# Patient Record
Sex: MALE | Race: WHITE | Employment: OTHER | URBAN - NONMETROPOLITAN AREA
[De-identification: names, ages, dates, MRNs, and addresses within clinical notes are randomized per-mention and may not be internally consistent; named-entity substitution may affect disease eponyms.]

---

## 2016-11-30 LAB
ALT SERPL-CCNC: 40 U/L (ref 18–65)
AST SERPL-CCNC: 22 U/L (ref 10–40)
CREAT SERPL-MCNC: 0.89 MG/DL (ref 0.8–1.5)
GLUCOSE SERPL-MCNC: 116 MG/DL (ref 60–99)
POTASSIUM SERPL-SCNC: 4.2 MEQ/L (ref 3.5–5.1)

## 2017-04-06 ENCOUNTER — TRANSFERRED RECORDS (OUTPATIENT)
Dept: HEALTH INFORMATION MANAGEMENT | Facility: HOSPITAL | Age: 62
End: 2017-04-06

## 2017-04-06 LAB
ALT SERPL-CCNC: 39 U/L (ref 18–65)
AST SERPL-CCNC: 23 U/L (ref 10–40)
CREAT SERPL-MCNC: 0.93 MG/DL (ref 0.8–1.5)
GLUCOSE SERPL-MCNC: 126 MG/DL (ref 60–99)
HBA1C MFR BLD: 6.3 % (ref 4–6)
POTASSIUM SERPL-SCNC: 4.2 MEQ/L (ref 3.5–5.1)

## 2017-04-07 DIAGNOSIS — R06.02 SOB (SHORTNESS OF BREATH): Primary | ICD-10-CM

## 2017-04-07 DIAGNOSIS — Z86.711 HISTORY OF PULMONARY EMBOLISM: ICD-10-CM

## 2017-04-07 DIAGNOSIS — J44.9 COPD, SEVERE (H): ICD-10-CM

## 2017-04-13 ENCOUNTER — TELEPHONE (OUTPATIENT)
Dept: WOUND CARE | Facility: OTHER | Age: 62
End: 2017-04-13

## 2017-04-13 DIAGNOSIS — E11.65 TYPE 2 DIABETES MELLITUS WITH HYPERGLYCEMIA, WITHOUT LONG-TERM CURRENT USE OF INSULIN (H): Primary | ICD-10-CM

## 2017-04-13 NOTE — TELEPHONE ENCOUNTER
Patient has an appointment with Diabetic Education. Needs a new referral, referral pending. Please sign.

## 2017-04-17 ENCOUNTER — HOSPITAL ENCOUNTER (OUTPATIENT)
Dept: CT IMAGING | Facility: HOSPITAL | Age: 62
Discharge: HOME | End: 2017-04-17
Attending: FAMILY MEDICINE | Admitting: FAMILY MEDICINE
Payer: MEDICARE

## 2017-04-17 PROCEDURE — 71275 CT ANGIOGRAPHY CHEST: CPT | Mod: TC

## 2017-04-17 RX ORDER — IOPAMIDOL 612 MG/ML
100 INJECTION, SOLUTION INTRAVASCULAR ONCE
Status: COMPLETED | OUTPATIENT
Start: 2017-04-17 | End: 2017-04-17

## 2017-04-17 RX ADMIN — IOPAMIDOL 75 ML: 612 INJECTION, SOLUTION INTRAVASCULAR at 16:15

## 2017-10-20 ENCOUNTER — TRANSFERRED RECORDS (OUTPATIENT)
Dept: HEALTH INFORMATION MANAGEMENT | Facility: HOSPITAL | Age: 62
End: 2017-10-20

## 2017-12-12 ENCOUNTER — HOSPITAL ENCOUNTER (OUTPATIENT)
Dept: EDUCATION SERVICES | Facility: HOSPITAL | Age: 62
Discharge: HOME | End: 2017-12-12
Attending: NURSE PRACTITIONER | Admitting: NURSE PRACTITIONER
Payer: MEDICARE

## 2017-12-12 VITALS
WEIGHT: 290.5 LBS | HEIGHT: 69 IN | SYSTOLIC BLOOD PRESSURE: 139 MMHG | OXYGEN SATURATION: 86 % | BODY MASS INDEX: 43.03 KG/M2 | DIASTOLIC BLOOD PRESSURE: 79 MMHG | HEART RATE: 93 BPM

## 2017-12-12 DIAGNOSIS — Z79.4 TYPE 2 DIABETES MELLITUS WITHOUT COMPLICATION, WITH LONG-TERM CURRENT USE OF INSULIN (H): Primary | ICD-10-CM

## 2017-12-12 DIAGNOSIS — E11.9 TYPE 2 DIABETES MELLITUS WITHOUT COMPLICATION, WITH LONG-TERM CURRENT USE OF INSULIN (H): Primary | ICD-10-CM

## 2017-12-12 PROCEDURE — G0108 DIAB MANAGE TRN  PER INDIV: HCPCS

## 2017-12-12 ASSESSMENT — PAIN SCALES - GENERAL: PAINLEVEL: MODERATE PAIN (5)

## 2017-12-12 NOTE — IP AVS SNAPSHOT
"                  MRN:[id number]                      After Visit Summary   12/12/2017    Freddy JENNY Bermudez    MRN: [id number]           Thank you!     Thank you for choosing [facility] for your care. Our goal is always to provide you with excellent care. Hearing back from our patients is one way we can continue to improve our services. Please take a few minutes to complete the written survey that you may receive in the mail after you visit with us. Thank you!        Patient Information     Date Of Birth          1955        About your hospital stay     You were admitted on:  December 12, 2017 You last received care in the:  [facility]    You were discharged on:  December 12, 2017       Who to Call     For medical emergencies, please call 911.  For non-urgent questions about your medical care, please call your primary care provider or clinic, [phone]          Attending Provider     Provider Specialty    Keily Rivas NP Nurse Practitioner - Family       Primary Care Provider Office Phone # Fax #    Umer Gasca [phone]      Your next 10 appointments already scheduled     Jan 02, 2018  1:00 PM CST   (Arrive by 12:45 PM)   Return Visit with Krystle Jose RN   [facility] ([facility] )    [address] [place] [id number] [phone]              Further instructions from your care team       Test BG once a day varying times:    Odd days: test in morning before coffee or food.    Even days: test two hours after your evening    Return to Diabetes Ed in 2 weeks.    Pending Results     No orders found from 12/10/2017 to 12/13/2017.            Admission Information     Date & Time Provider Department Dept. Phone    12/12/2017 Keily Rivas NP [facility] [phone]      Your Vitals Were     Blood Pressure Pulse Height Weight Pulse Oximetry BMI (Body Mass Index)    139/79 93 1.753 m (5' 9\") 131.8 kg (290 lb 8 oz) 86% 42.9 kg/m2    " "  MyChart Information     Jamn lets you send messages to your doctor, view your test results, renew your prescriptions, schedule appointments and more. To sign up, go to [email or web address] . Click on \"Log in\" on the left side of the screen, which will take you to the Welcome page. Then click on \"Sign up Now\" on the right side of the page.     You will be asked to enter the access code listed below, as well as some personal information. Please follow the directions to create your username and password.     Your access code is: [email or web address]  Expires: 3/12/2018  2:30 PM     Your access code will  in 90 days. If you need help or a new code, please call your [place] clinic or [phone].        Care EveryWhere ID     This is your Care EveryWhere ID. This could be used by other organizations to access your [place] medical records  [id number]        Equal Access to Services     DOV TOM : Hadii johnny thomas hadasho Soomaali, waaxda luqadaha, qaybta kaalmada adeegyada, himanshu bethea . So [facility] [phone].    ATENCIÓN: Si alexandre irby, tiene a romero disposición servicios gratuitos de asistencia lingüística. Llame al [phone].    We comply with applicable federal civil rights laws and [place] laws. We do not discriminate on the basis of race, color, national origin, age, disability, sex, sexual orientation, or gender identity.               Review of your medicines      Notice     You have not been prescribed any medications.             Protect others around you: Learn how to safely use, store and throw away your medicines at www.disposemymeds.org.             Medication List: This is a list of all your medications and when to take them. Check marks below indicate your daily home schedule. Keep this list as a reference.      Notice     You have not been prescribed any medications.      "

## 2017-12-12 NOTE — NURSING NOTE
"Chief Complaint   Patient presents with     Diabetes     session 1       Initial /79  Pulse 93  Ht 1.753 m (5' 9\")  Wt 131.8 kg (290 lb 8 oz)  SpO2 (!) 86%  BMI 42.9 kg/m2 Estimated body mass index is 42.9 kg/(m^2) as calculated from the following:    Height as of this encounter: 1.753 m (5' 9\").    Weight as of this encounter: 131.8 kg (290 lb 8 oz).  Medication Reconciliation: complete   Rhoda Weber      "

## 2017-12-12 NOTE — MR AVS SNAPSHOT
"              After Visit Summary   2017    Freddy JENNY Bermudez    MRN: [id number]           Patient Information     Date Of Birth          1955        Visit Information        Provider Department      2017  1:00 PM Krystle Jose RN [facility]         Follow-ups after your visit        Your next 10 appointments already scheduled     2018  1:00 PM CST   (Arrive by 12:45 PM)   Return Visit with Krystle Jose RN   [facility] ([facility] )    [address] [place] [id number] [phone]              Who to contact     If you have questions or need follow up information about today's clinic visit or your schedule please contact [facility] directly at [phone].  Normal or non-critical lab and imaging results will be communicated to you by [employer], letter or phone within 4 business days after the clinic has received the results. If you do not hear from us within 7 days, please contact the clinic through [employer] or phone. If you have a critical or abnormal lab result, we will notify you by phone as soon as possible.  Submit refill requests through [employer] or call your pharmacy and they will forward the refill request to us. Please allow 3 business days for your refill to be completed.          Additional Information About Your Visit        [employer] Information     [employer] lets you send messages to your doctor, view your test results, renew your prescriptions, schedule appointments and more. To sign up, go to [email or web address] . Click on \"Log in\" on the left side of the screen, which will take you to the Welcome page. Then click on \"Sign up Now\" on the right side of the page.     You will be asked to enter the access code listed below, as well as some personal information. Please follow the directions to create your username and password.     Your access code is: [email or web address]  Expires: 3/12/2018  2:30 PM     Your access code will  in 90 " "days. If you need help or a new code, please call your [place] clinic or [phone].        Care EveryWhere ID     This is your Care EveryWhere ID. This could be used by other organizations to access your [place] medical records  [id number]        Your Vitals Were     Pulse Height Pulse Oximetry BMI (Body Mass Index)          93 1.753 m (5' 9\") 86% 42.9 kg/m2         Blood Pressure from Last 3 Encounters:   12/12/17 139/79   10/31/15 149/89   10/31/15 149/89    Weight from Last 3 Encounters:   12/12/17 131.8 kg (290 lb 8 oz)   03/05/15 102 kg (224 lb 13.9 oz)   03/25/14 102.1 kg (225 lb)              Today, you had the following     No orders found for display       Primary Care Provider Office Phone # Fax #    Umer Gasca [phone]       [facility] [address] [facility] [address]        Equal Access to Services     [facility] Center[facility] : Hadii aad ku hadasho Soomaali, waaxda luqadaha, qaybta kaalmada adeegyada, waxay idiin hayalejandro bethea . So [facility] [place] [phone].    ATENCIÓN: Si habla español, tiene a romero disposición servicios gratuitos de asistencia lingüística. Quincyame al [phone].    We comply with applicable federal civil rights laws and [place] laws. We do not discriminate on the basis of race, color, national origin, age, disability, sex, sexual orientation, or gender identity.            Thank you!     Thank you for choosing [facility]  for your care. Our goal is always to provide you with excellent care. Hearing back from our patients is one way we can continue to improve our services. Please take a few minutes to complete the written survey that you may receive in the mail after your visit with us. Thank you!             Your Updated Medication List - Protect others around you: Learn how to safely use, store and throw away your medicines at www.disposemymeds.org.      Notice     You have not been prescribed any medications.      "

## 2017-12-12 NOTE — DISCHARGE INSTRUCTIONS
Test BG once a day varying times:    Odd days: test in morning before coffee or food.    Even days: test two hours after your evening    Return to Diabetes Ed in 2 weeks.

## 2017-12-13 ENCOUNTER — TRANSFERRED RECORDS (OUTPATIENT)
Dept: HEALTH INFORMATION MANAGEMENT | Facility: HOSPITAL | Age: 62
End: 2017-12-13

## 2017-12-13 LAB — HBA1C MFR BLD: 5.9 % (ref 4–6)

## 2017-12-14 NOTE — PROGRESS NOTES
"Freddy is here for Session 1.    BG readings as follows: not testing yet    Blood pressure 139/79, pulse 93, height 1.753 m (5' 9\"), weight 131.8 kg (290 lb 8 oz), SpO2 (!) 86 %.    Current diabetes medications: none    Readiness to learn: eager    Barriers to learning: none    Freddy brought his grocery list for review. Discussed usual foods eaten. Has been selecting appropriate food choices and portions.    Topics covered: Diabetes pathophysiology, symptoms, diagnosis, treatments, medication actions, BG self-monitoring, HgbA1c, targets (blood sugar/A1c), sharps disposal, complications and risk factors,  food planning, benefits of physical activity, carbohydrate counting, and individualized food plan.     Pt actively participated and had many questions and demonstrated adequate understanding of topics discussed.    Pt instructed on Contour Next glucose meter and appropriately demonstrated use with minimal cueing. Testing supplies ordered.     Plan:   Test BG once a day varying times:    Odd days: test in morning before coffee or food.    Even days: test two hours after your evening    Sent a message to Dr. Gasca requesting re-check of A1C at Freddy's office visit with him tomorrow.    Return to Diabetes Ed in 2 weeks.      Total time spent with patient: 60 minutes    "

## 2017-12-20 ENCOUNTER — HOSPITAL ENCOUNTER (OUTPATIENT)
Dept: MRI IMAGING | Facility: HOSPITAL | Age: 62
Discharge: HOME | End: 2017-12-20
Attending: FAMILY MEDICINE | Admitting: FAMILY MEDICINE
Payer: MEDICARE

## 2017-12-20 DIAGNOSIS — M25.561 RIGHT KNEE PAIN: ICD-10-CM

## 2017-12-20 PROCEDURE — 73721 MRI JNT OF LWR EXTRE W/O DYE: CPT | Mod: TC,RT

## 2018-11-25 ENCOUNTER — HOSPITAL ENCOUNTER (EMERGENCY)
Facility: HOSPITAL | Age: 63
Discharge: ANOTHER HOSPITAL | End: 2018-11-25
Attending: PHYSICIAN ASSISTANT | Admitting: PHYSICIAN ASSISTANT
Payer: MEDICARE

## 2018-11-25 ENCOUNTER — APPOINTMENT (OUTPATIENT)
Dept: CT IMAGING | Facility: HOSPITAL | Age: 63
End: 2018-11-25
Attending: PHYSICIAN ASSISTANT
Payer: MEDICARE

## 2018-11-25 VITALS
TEMPERATURE: 97.9 F | OXYGEN SATURATION: 87 % | RESPIRATION RATE: 10 BRPM | DIASTOLIC BLOOD PRESSURE: 97 MMHG | SYSTOLIC BLOOD PRESSURE: 164 MMHG | HEART RATE: 87 BPM

## 2018-11-25 DIAGNOSIS — I71.30 RUPTURED ABDOMINAL AORTIC ANEURYSM (AAA) (H): ICD-10-CM

## 2018-11-25 LAB
ALBUMIN SERPL-MCNC: 3.2 G/DL (ref 3.4–5)
ALP SERPL-CCNC: 86 U/L (ref 40–150)
ALT SERPL W P-5'-P-CCNC: 29 U/L (ref 0–70)
ANION GAP SERPL CALCULATED.3IONS-SCNC: 9 MMOL/L (ref 3–14)
AST SERPL W P-5'-P-CCNC: 15 U/L (ref 0–45)
BASE EXCESS BLDV CALC-SCNC: 1.3 MMOL/L
BASOPHILS # BLD AUTO: 0 10E9/L (ref 0–0.2)
BASOPHILS NFR BLD AUTO: 0.3 %
BILIRUB SERPL-MCNC: 0.7 MG/DL (ref 0.2–1.3)
BLD PROD TYP BPU: NORMAL
BLD PROD TYP BPU: NORMAL
BLD UNIT ID BPU: 0
BLD UNIT ID BPU: 0
BLOOD PRODUCT CODE: NORMAL
BLOOD PRODUCT CODE: NORMAL
BPU ID: NORMAL
BPU ID: NORMAL
BUN SERPL-MCNC: 12 MG/DL (ref 7–30)
CALCIUM SERPL-MCNC: 8.2 MG/DL (ref 8.5–10.1)
CHLORIDE SERPL-SCNC: 100 MMOL/L (ref 94–109)
CO2 SERPL-SCNC: 25 MMOL/L (ref 20–32)
CREAT SERPL-MCNC: 0.66 MG/DL (ref 0.66–1.25)
CRP SERPL-MCNC: 19 MG/L (ref 0–8)
DIFFERENTIAL METHOD BLD: ABNORMAL
EOSINOPHIL # BLD AUTO: 0.1 10E9/L (ref 0–0.7)
EOSINOPHIL NFR BLD AUTO: 0.7 %
ERYTHROCYTE [DISTWIDTH] IN BLOOD BY AUTOMATED COUNT: 16 % (ref 10–15)
GFR SERPL CREATININE-BSD FRML MDRD: >90 ML/MIN/1.7M2
GLUCOSE SERPL-MCNC: 153 MG/DL (ref 70–99)
HCO3 BLDV-SCNC: 28 MMOL/L (ref 21–28)
HCT VFR BLD AUTO: 57 % (ref 40–53)
HGB BLD-MCNC: 19.4 G/DL (ref 13.3–17.7)
IMM GRANULOCYTES # BLD: 0 10E9/L (ref 0–0.4)
IMM GRANULOCYTES NFR BLD: 0.3 %
LYMPHOCYTES # BLD AUTO: 0.9 10E9/L (ref 0.8–5.3)
LYMPHOCYTES NFR BLD AUTO: 10.1 %
MCH RBC QN AUTO: 32.7 PG (ref 26.5–33)
MCHC RBC AUTO-ENTMCNC: 34 G/DL (ref 31.5–36.5)
MCV RBC AUTO: 96 FL (ref 78–100)
MONOCYTES # BLD AUTO: 0.6 10E9/L (ref 0–1.3)
MONOCYTES NFR BLD AUTO: 6.6 %
NEUTROPHILS # BLD AUTO: 7.2 10E9/L (ref 1.6–8.3)
NEUTROPHILS NFR BLD AUTO: 82 %
NRBC # BLD AUTO: 0 10*3/UL
NRBC BLD AUTO-RTO: 0 /100
O2/TOTAL GAS SETTING VFR VENT: 36 %
OXYHGB MFR BLDV: 86 %
PCO2 BLDV: 47 MM HG (ref 40–50)
PH BLDV: 7.38 PH (ref 7.32–7.43)
PLATELET # BLD AUTO: 135 10E9/L (ref 150–450)
PO2 BLDV: 57 MM HG (ref 25–47)
POTASSIUM SERPL-SCNC: 3.7 MMOL/L (ref 3.4–5.3)
PROT SERPL-MCNC: 7.1 G/DL (ref 6.8–8.8)
RADIOLOGIST FLAGS: ABNORMAL
RBC # BLD AUTO: 5.94 10E12/L (ref 4.4–5.9)
SODIUM SERPL-SCNC: 134 MMOL/L (ref 133–144)
TRANSFUSION STATUS PATIENT QL: NORMAL
WBC # BLD AUTO: 8.7 10E9/L (ref 4–11)

## 2018-11-25 PROCEDURE — 86900 BLOOD TYPING SEROLOGIC ABO: CPT | Performed by: PHYSICIAN ASSISTANT

## 2018-11-25 PROCEDURE — 74177 CT ABD & PELVIS W/CONTRAST: CPT | Mod: TC

## 2018-11-25 PROCEDURE — 36415 COLL VENOUS BLD VENIPUNCTURE: CPT | Performed by: PHYSICIAN ASSISTANT

## 2018-11-25 PROCEDURE — 85025 COMPLETE CBC W/AUTO DIFF WBC: CPT | Performed by: PHYSICIAN ASSISTANT

## 2018-11-25 PROCEDURE — 25000128 H RX IP 250 OP 636: Performed by: PHYSICIAN ASSISTANT

## 2018-11-25 PROCEDURE — 96375 TX/PRO/DX INJ NEW DRUG ADDON: CPT | Mod: XU

## 2018-11-25 PROCEDURE — 36430 TRANSFUSION BLD/BLD COMPNT: CPT

## 2018-11-25 PROCEDURE — 25500064 ZZH RX 255 OP 636: Performed by: PHYSICIAN ASSISTANT

## 2018-11-25 PROCEDURE — 80053 COMPREHEN METABOLIC PANEL: CPT | Performed by: PHYSICIAN ASSISTANT

## 2018-11-25 PROCEDURE — 86922 COMPATIBILITY TEST ANTIGLOB: CPT | Performed by: PHYSICIAN ASSISTANT

## 2018-11-25 PROCEDURE — 99285 EMERGENCY DEPT VISIT HI MDM: CPT | Mod: 25

## 2018-11-25 PROCEDURE — 86140 C-REACTIVE PROTEIN: CPT | Performed by: PHYSICIAN ASSISTANT

## 2018-11-25 PROCEDURE — 96374 THER/PROPH/DIAG INJ IV PUSH: CPT | Mod: XU

## 2018-11-25 PROCEDURE — 86850 RBC ANTIBODY SCREEN: CPT | Performed by: PHYSICIAN ASSISTANT

## 2018-11-25 PROCEDURE — 96361 HYDRATE IV INFUSION ADD-ON: CPT

## 2018-11-25 PROCEDURE — 86901 BLOOD TYPING SEROLOGIC RH(D): CPT | Performed by: PHYSICIAN ASSISTANT

## 2018-11-25 PROCEDURE — 82805 BLOOD GASES W/O2 SATURATION: CPT | Performed by: PHYSICIAN ASSISTANT

## 2018-11-25 PROCEDURE — P9016 RBC LEUKOCYTES REDUCED: HCPCS | Performed by: PHYSICIAN ASSISTANT

## 2018-11-25 PROCEDURE — 99285 EMERGENCY DEPT VISIT HI MDM: CPT | Performed by: PHYSICIAN ASSISTANT

## 2018-11-25 RX ORDER — IOPAMIDOL 612 MG/ML
100 INJECTION, SOLUTION INTRAVASCULAR ONCE
Status: COMPLETED | OUTPATIENT
Start: 2018-11-25 | End: 2018-11-25

## 2018-11-25 RX ORDER — ONDANSETRON 2 MG/ML
4 INJECTION INTRAMUSCULAR; INTRAVENOUS ONCE
Status: COMPLETED | OUTPATIENT
Start: 2018-11-25 | End: 2018-11-25

## 2018-11-25 RX ORDER — LABETALOL HYDROCHLORIDE 5 MG/ML
10 INJECTION, SOLUTION INTRAVENOUS ONCE
Status: COMPLETED | OUTPATIENT
Start: 2018-11-25 | End: 2018-11-25

## 2018-11-25 RX ORDER — LABETALOL HYDROCHLORIDE 5 MG/ML
20 INJECTION, SOLUTION INTRAVENOUS ONCE
Status: COMPLETED | OUTPATIENT
Start: 2018-11-25 | End: 2018-11-25

## 2018-11-25 RX ORDER — MORPHINE SULFATE 4 MG/ML
4 INJECTION, SOLUTION INTRAMUSCULAR; INTRAVENOUS ONCE
Status: COMPLETED | OUTPATIENT
Start: 2018-11-25 | End: 2018-11-25

## 2018-11-25 RX ADMIN — SODIUM CHLORIDE 1000 ML: 9 INJECTION, SOLUTION INTRAVENOUS at 10:42

## 2018-11-25 RX ADMIN — ONDANSETRON 4 MG: 2 INJECTION INTRAMUSCULAR; INTRAVENOUS at 10:43

## 2018-11-25 RX ADMIN — MORPHINE SULFATE 4 MG: 4 INJECTION INTRAVENOUS at 10:46

## 2018-11-25 RX ADMIN — LABETALOL HYDROCHLORIDE 10 MG: 5 INJECTION, SOLUTION INTRAVENOUS at 12:13

## 2018-11-25 RX ADMIN — LABETALOL HYDROCHLORIDE 10 MG: 5 INJECTION, SOLUTION INTRAVENOUS at 12:12

## 2018-11-25 RX ADMIN — LABETALOL HYDROCHLORIDE 20 MG: 5 INJECTION, SOLUTION INTRAVENOUS at 11:49

## 2018-11-25 RX ADMIN — IOPAMIDOL 100 ML: 612 INJECTION, SOLUTION INTRAVENOUS at 11:28

## 2018-11-25 ASSESSMENT — ENCOUNTER SYMPTOMS
MUSCULOSKELETAL NEGATIVE: 1
DIARRHEA: 0
CHILLS: 1
RECTAL PAIN: 0
DYSURIA: 0
ABDOMINAL PAIN: 1
NAUSEA: 1
VOMITING: 0
APPETITE CHANGE: 0
CONSTIPATION: 0
ABDOMINAL DISTENTION: 0
FEVER: 0
HEMATURIA: 0
SORE THROAT: 0
SHORTNESS OF BREATH: 1
NEUROLOGICAL NEGATIVE: 1
BLOOD IN STOOL: 0
UNEXPECTED WEIGHT CHANGE: 0
ANAL BLEEDING: 0
HEADACHES: 0

## 2018-11-25 NOTE — ED NOTES
1st unit of PRBC's started, unable to scan d/t emergent condition, infusing at 250ML/HR. No signs of reaction

## 2018-11-25 NOTE — DISCHARGE INSTRUCTIONS
What to expect when you have contrast    During your exam, we will inject  contrast  into your vein or artery. (Contrast is a clear liquid with iodine in it. It shows up on X-rays.)    You may feel warm or hot. You may have a metal taste in your mouth and a slight upset stomach. You may also feel pressure near the kidneys and bladder. These effects will last about 1 to 3 minutes.    Please tell us if you have:    Sneezing     Itching    Hives     Swelling in the face    A hoarse voice    Breathing problems    Other new symptoms    Serious problems are rare.  They may include:    Irregular heartbeat     Seizures    Kidney failure              Tissue damage    Shock      Death    If you have any problems during the exam, we  will treat them right away.    When you get home    Call your hospital if you have any new symptoms in the next 2 days, like hives or swelling. (Phone numbers are at the bottom of this page.) Or call your family doctor.     If you have wheezing or trouble breathing, call 911.    Self-care  -Drink at least 4 extra glasses of water today.   This reduces the stress on your kidneys.  -Keep taking your regular medicines.    The contrast will pass out of your body in your  Urine(pee). This will happen in the next 24 hours. You  will not feel this. Your urine will not  change color.    If you have kidney problems or take metformin    Drink 4 to 8 large glasses of water for the next  2 days, if you are not on a fluid restriction.    ?If you take metformin (Glucophage or Glucovance) for diabetes, keep taking it.      ?Your kidney function tests are abnormal.  If you take Metformin, do not take it for 48 hours. Please go to your clinic for a blood test within 3 days after your exam before the restarting this medicine.     (Note to provider:please give patient prescription for lab tests.)    ?Special instructions:     I have read and understand the above information.    Patient Sign  Here:______________________________________Date:________Time:______    Staff Sign Here:________________________________________Date:_______Time:______      Radiology Departments:     ?[facility]: [phone] ?[facility]: [phone]     ?[facility]: [phone] ?[place]:[phone]      ?[facility]: [phone]  ?[place]: [phone]  ?[place]:[phone]    ?[facility]:[phone]  ?[facility]:[phone]

## 2018-11-25 NOTE — ED NOTES
"Presents with lower abd pain, rates 7/10, \"pain started last night.\" Has felt nauseated, reports no vomiting. Does have history of COPD and sleep apnea. See assessments. Call light placed within reach.   "

## 2018-11-25 NOTE — ED PROVIDER NOTES
History     Chief Complaint   Patient presents with     Abdominal Pain     started last night, no diarrhea or constipation     HPI  Freddy Bermudez is a 63 year old male who presents with lower abdominal pain, chills, and nausea since last night. He has h/o COPD, non-oxygen dependant, though was 84%RA in triage. He admits to chronic dyspnea with exertion, denies any worsening dyspnea recently, any CP, or cough. Normal, large BM this AM. Denies black/bloody stools or diarrhea. H/o cholecystectomy and appendectomy. Denies hematuria or dysuria.     Problem List:    Patient Active Problem List    Diagnosis Date Noted     Hypoxia 03/04/2015     Priority: Medium     COPD with acute bronchitis (H) 03/04/2015     Priority: Medium        Past Medical History:    Past Medical History:   Diagnosis Date     Hypertension      Sleep apnea        Past Surgical History:    Past Surgical History:   Procedure Laterality Date     APPENDECTOMY       CHOLECYSTECTOMY       ORTHOPEDIC SURGERY       PHACOEMULSIFICATION CLEAR CORNEA WITH TORIC INTRAOCULAR LENS IMPLANT  3/10/2014    Procedure: PHACOEMULSIFICATION CLEAR CORNEA WITH TORIC INTRAOCULAR LENS IMPLANT;  CATARACT EXTRACTION WITH INTRA OCULAR LENS RIGHT(TORIC);  Surgeon: David Garcia MD;  Location: [facility]     PHACOEMULSIFICATION CLEAR CORNEA WITH TORIC INTRAOCULAR LENS IMPLANT  3/25/2014    Procedure: PHACOEMULSIFICATION CLEAR CORNEA WITH TORIC INTRAOCULAR LENS IMPLANT;  CATARACT EXTRACTION WITH INTRA OCULAR LENS LEFT TORIC;  Surgeon: David Garcia MD;  Location: [facility]       Family History:    No family history on file.    Social History:  Marital Status:  Single [1]  Social History   Substance Use Topics     Smoking status: Former Smoker     Smokeless tobacco: Never Used     Alcohol use Yes        Medications:      blood glucose monitoring (SHANIQUE CONTOUR) test strip   blood glucose monitoring (SHANIQUE MICROLET) lancets         Review of Systems   Constitutional: Positive for  chills. Negative for appetite change, fever and unexpected weight change.   HENT: Negative for sore throat.    Respiratory: Positive for shortness of breath (With exertion, chronic.).    Cardiovascular: Negative for chest pain.   Gastrointestinal: Positive for abdominal pain and nausea. Negative for abdominal distention, anal bleeding, blood in stool, constipation, diarrhea, rectal pain and vomiting.   Genitourinary: Negative.  Negative for dysuria, hematuria, scrotal swelling and testicular pain.   Musculoskeletal: Negative.    Skin: Negative.    Neurological: Negative.  Negative for headaches.   All other systems reviewed and are negative.      Physical Exam   BP: (!) 187/126  Pulse: 87  Heart Rate: 94  Temp: 97.1  F (36.2  C)  Resp: 20  SpO2: (!) 84 %      Physical Exam   Constitutional: He is oriented to person, place, and time. He appears well-developed and well-nourished.  Non-toxic appearance. He does not have a sickly appearance. He does not appear ill. No distress.   HENT:   Head: Normocephalic and atraumatic.   Nose: Nose normal.   Mouth/Throat: Oropharynx is clear and moist. No oropharyngeal exudate.   Eyes: Conjunctivae are normal. Pupils are equal, round, and reactive to light. Right eye exhibits no discharge. Left eye exhibits no discharge. No scleral icterus.   Neck: Normal range of motion. Neck supple.   Cardiovascular: Normal rate, regular rhythm, normal heart sounds and intact distal pulses.  Exam reveals no gallop and no friction rub.    No murmur heard.  Pulmonary/Chest: Effort normal and breath sounds normal. No respiratory distress. He has no wheezes. He has no rales.   Clubbed fingers.    Abdominal: Soft. Bowel sounds are normal. He exhibits no distension and no mass. There is tenderness in the suprapubic area and left lower quadrant. There is no rebound and no guarding.   Obese abdomen.    Musculoskeletal: Normal range of motion. He exhibits no edema.   Lymphadenopathy:     He has no cervical  adenopathy.   Neurological: He is alert and oriented to person, place, and time. No cranial nerve deficit. Coordination normal.   Skin: Skin is warm and dry. No rash noted. He is not diaphoretic. No erythema. No pallor.   Psychiatric: He has a normal mood and affect. His behavior is normal. Judgment and thought content normal.   Nursing note and vitals reviewed.      ED Course     ED Course     Procedures          Results for orders placed or performed during the hospital encounter of 11/25/18 (from the past 24 hour(s))   CBC with platelets differential   Result Value Ref [facility]    WBC 8.7 4.0 - 11.0 10e9/L    RBC Count 5.94 (H) 4.4 - 5.9 10e12/L    Hemoglobin 19.4 (H) 13.3 - 17.7 g/dL    Hematocrit 57.0 (H) 40.0 - 53.0 %    MCV 96 78 - 100 fl    MCH 32.7 26.5 - 33.0 pg    MCHC 34.0 31.5 - 36.5 g/dL    RDW 16.0 (H) 10.0 - 15.0 %    Platelet Count 135 (L) 150 - 450 10e9/L    Diff Method Automated Method     % Neutrophils 82.0 %    % Lymphocytes 10.1 %    % Monocytes 6.6 %    % Eosinophils 0.7 %    % Basophils 0.3 %    % Immature Granulocytes 0.3 %    Nucleated RBCs 0 0 /100    Absolute Neutrophil 7.2 1.6 - 8.3 10e9/L    Absolute Lymphocytes 0.9 0.8 - 5.3 10e9/L    Absolute Monocytes 0.6 0.0 - 1.3 10e9/L    Absolute Eosinophils 0.1 0.0 - 0.7 10e9/L    Absolute Basophils 0.0 0.0 - 0.2 10e9/L    Abs Immature Granulocytes 0.0 0 - 0.4 10e9/L    Absolute Nucleated RBC 0.0    Comprehensive metabolic panel   Result Value Ref [facility]    Sodium 134 133 - 144 mmol/L    Potassium 3.7 3.4 - 5.3 mmol/L    Chloride 100 94 - 109 mmol/L    Carbon Dioxide 25 20 - 32 mmol/L    Anion Gap 9 3 - 14 mmol/L    Glucose 153 (H) 70 - 99 mg/dL    Urea Nitrogen 12 7 - 30 mg/dL    Creatinine 0.66 0.66 - 1.25 mg/dL    GFR Estimate >90 >60 mL/min/1.7m2    GFR Estimate If Black >90 >60 mL/min/1.7m2    Calcium 8.2 (L) 8.5 - 10.1 mg/dL    Bilirubin Total 0.7 0.2 - 1.3 mg/dL    Albumin 3.2 (L) 3.4 - 5.0 g/dL    Protein Total 7.1 6.8 - 8.8 g/dL    Alkaline  Phosphatase 86 40 - 150 U/L    ALT 29 0 - 70 U/L    AST 15 0 - 45 U/L   Blood gas venous and oxyhgb   Result Value Ref [facility]    Ph Venous 7.38 7.32 - 7.43 pH    PCO2 Venous 47 40 - 50 mm Hg    PO2 Venous 57 (H) 25 - 47 mm Hg    Bicarbonate Venous 28 21 - 28 mmol/L    FIO2 36     Oxyhemoglobin Venous 86 %    Base Excess Venous 1.3 mmol/L   CRP inflammation   Result Value Ref [facility]    CRP Inflammation 19.0 (H) 0.0 - 8.0 mg/L   CT Abdomen Pelvis w Contrast   Result Value Ref [facility]    Radiologist flags Leaking abdominal aortic aneurysm (AA)     Narrative    EXAMINATION: CT ABDOMEN PELVIS W CONTRAST, 11/25/2018 11:36 AM    TECHNIQUE:  Helical CT images from the lung bases through the  symphysis pubis were obtained  with IV contrast. Contrast dose: ISOVUE  300 100ML    COMPARISON: none    HISTORY: lower abdominal pain, suspect diverticulitis;     FINDINGS:    There is dependent atelectasis at the lung bases.    The liver is free of masses or biliary ductal enlargement. The  gallbladder has been removed    The the spleen and pancreas appear normal.    The adrenal glands are normal.    The right and left kidneys are free of masses or hydronephrosis.    There is a 6.9 cm infrarenal abdominal aortic aneurysm. There is  abnormal increase in density in the fat immediately anterior to the  distal abdominal aorta suggesting early leaking of the abdominal  aortic aneurysm.    No intraperitoneal masses or inflammatory changes are noted.    In the pelvis the bladder and rectum appear normal.    Degenerative changes are present at L5-S1      Impression    IMPRESSION: 6.9 cm abdominal aortic aneurysm with increased density in  the fat anterior to the aorta suggesting early rupture   [Critical Result: Leaking abdominal aortic aneurysm]    Finding was identified on 11/25/2018 11:38 AM.     Emergency room was contacted by me on 11/25/2018 11:45 AM and  verbalized understanding of the critical result.      THONG ANDREA MD        Medications   labetalol (NORMODYNE/TRANDATE) injection 10 mg (not administered)   morphine (PF) injection 4 mg (4 mg Intravenous Given 11/25/18 1046)   ondansetron (ZOFRAN) injection 4 mg (4 mg Intravenous Given 11/25/18 1043)   0.9% sodium chloride BOLUS (0 mLs Intravenous Stopped 11/25/18 1204)   iopamidol (ISOVUE-300) IV solution 61% 100 mL (100 mLs Intravenous Given 11/25/18 1128)   sodium chloride (PF) 0.9% PF flush 60 mL (60 mLs Intravenous Given 11/25/18 1128)   labetalol (NORMODYNE/TRANDATE) injection 20 mg (20 mg Intravenous Given 11/25/18 1149)       Assessments & Plan (with Medical Decision Making)   Freddy is in NAD, hypertensive upon arrival at 187/126. 8/10 lower abdominal pain. Slight tenderness to the lower abdomen on exam, no peritoneal signs. Morphine 4mg IV and Zofran 4mg IV given for pain and nausea and pt is sleeping following. CT abd/pelvis with IV contrast was performed and shows critical finding of AAA with early rupture. BP was decreased to 145/90 with Labetalol 20mg IV. Heart rate 67. Hemoglobin 19.4.  [facility] not have surgeon available for AAA repair. I spoke with Dr. Younger, surgeon at [facility] and he has kindly accepted pt for admission, he is to go straight to OR for repair. Goal BP < 150 systolic. Unfortunately, we are unable to fly by helicopter, therefore we are sending him via ALS ambulance who were able to come promptly to transport pt. We did start the first unit of PRBC's just prior to transfer, 2nd unit with ALS crew to give if any deterioration/changes in rout.       I have reviewed the nursing notes.    I have reviewed the findings, diagnosis, plan and need for follow up with the patient.    New Prescriptions    No medications on file       Final diagnoses:   Ruptured abdominal aortic aneurysm (AAA) (H)       11/25/2018   [facility]     Gayathri Cruz PA-C  11/25/18 1212

## 2018-11-25 NOTE — ED NOTES
"Pt left with [facility] with 1st unit of PRBC\"s infusing at 100ml/hr per provider, 2nd unit sent with EMS, in cooler with ice. No signs of reaction at time of transfer.   "

## 2018-11-26 LAB
ABO + RH BLD: NORMAL
ABO + RH BLD: NORMAL
BLD GP AB SCN SERPL QL: NORMAL
BLD PROD TYP BPU: NORMAL
BLOOD BANK CMNT PATIENT-IMP: NORMAL
NUM BPU REQUESTED: 2
SPECIMEN EXP DATE BLD: NORMAL

## 2018-12-05 ENCOUNTER — TRANSFERRED RECORDS (OUTPATIENT)
Dept: HEALTH INFORMATION MANAGEMENT | Facility: CLINIC | Age: 63
End: 2018-12-05

## 2018-12-05 LAB
ALT SERPL-CCNC: 31 U/L (ref 18–65)
AST SERPL-CCNC: 25 U/L (ref 10–40)
CREAT SERPL-MCNC: 0.79 MG/DL
GFR SERPL CREATININE-BSD FRML MDRD: >60 ML/MIN/1.73M2
GLUCOSE SERPL-MCNC: 114 MG/DL (ref 60–99)
HBA1C MFR BLD: 6.4 % (ref 4–6)
POTASSIUM SERPL-SCNC: 4.6 MEQ/L (ref 3.5–5.1)

## 2018-12-12 DIAGNOSIS — E11.9 DIABETES MELLITUS, TYPE 2 (H): Primary | ICD-10-CM

## 2018-12-27 PROBLEM — E11.9 DIABETES MELLITUS TYPE 2, DIET-CONTROLLED (H): Status: ACTIVE | Noted: 2018-12-27

## 2019-01-10 ENCOUNTER — HOSPITAL ENCOUNTER (OUTPATIENT)
Dept: RESPIRATORY THERAPY | Facility: HOSPITAL | Age: 64
Discharge: HOME | End: 2019-01-10
Attending: FAMILY MEDICINE | Admitting: FAMILY MEDICINE
Payer: MEDICARE

## 2019-01-10 LAB
BASE EXCESS BLDA CALC-SCNC: 6.2 MMOL/L
COHGB MFR BLD: 0.8 % (ref 0–2)
HCO3 BLD-SCNC: 33 MMOL/L (ref 21–28)
HGB BLD-MCNC: 17 G/DL (ref 13.3–17.7)
O2/TOTAL GAS SETTING VFR VENT: ABNORMAL %
OXYHGB MFR BLD: 93 % (ref 92–100)
PCO2 BLD: 54 MM HG (ref 35–45)
PH BLD: 7.39 PH (ref 7.35–7.45)
PO2 BLD: 68 MM HG (ref 80–105)

## 2019-01-10 PROCEDURE — 94729 DIFFUSING CAPACITY: CPT

## 2019-01-10 PROCEDURE — 94060 EVALUATION OF WHEEZING: CPT | Mod: 26 | Performed by: INTERNAL MEDICINE

## 2019-01-10 PROCEDURE — 36600 WITHDRAWAL OF ARTERIAL BLOOD: CPT

## 2019-01-10 PROCEDURE — 82375 ASSAY CARBOXYHB QUANT: CPT | Performed by: FAMILY MEDICINE

## 2019-01-10 PROCEDURE — 94726 PLETHYSMOGRAPHY LUNG VOLUMES: CPT

## 2019-01-10 PROCEDURE — 82805 BLOOD GASES W/O2 SATURATION: CPT | Performed by: FAMILY MEDICINE

## 2019-01-10 PROCEDURE — 94726 PLETHYSMOGRAPHY LUNG VOLUMES: CPT | Mod: 26 | Performed by: INTERNAL MEDICINE

## 2019-01-10 PROCEDURE — 25000125 ZZHC RX 250: Performed by: FAMILY MEDICINE

## 2019-01-10 PROCEDURE — 94060 EVALUATION OF WHEEZING: CPT

## 2019-01-10 PROCEDURE — 85018 HEMOGLOBIN: CPT | Performed by: FAMILY MEDICINE

## 2019-01-10 PROCEDURE — 94729 DIFFUSING CAPACITY: CPT | Mod: 26 | Performed by: INTERNAL MEDICINE

## 2019-01-10 RX ORDER — ALBUTEROL SULFATE 0.83 MG/ML
2.5 SOLUTION RESPIRATORY (INHALATION) ONCE
Status: COMPLETED | OUTPATIENT
Start: 2019-01-10 | End: 2019-01-10

## 2019-01-10 RX ADMIN — ALBUTEROL SULFATE 2.5 MG: 2.5 SOLUTION RESPIRATORY (INHALATION) at 15:15

## 2019-03-12 ENCOUNTER — MEDICAL CORRESPONDENCE (OUTPATIENT)
Dept: HEALTH INFORMATION MANAGEMENT | Facility: CLINIC | Age: 64
End: 2019-03-12

## 2019-03-21 ENCOUNTER — HOSPITAL ENCOUNTER (OUTPATIENT)
Dept: CT IMAGING | Facility: HOSPITAL | Age: 64
Discharge: HOME | End: 2019-03-21
Attending: SURGERY | Admitting: SURGERY
Payer: MEDICARE

## 2019-03-21 ENCOUNTER — APPOINTMENT (OUTPATIENT)
Dept: LAB | Facility: HOSPITAL | Age: 64
End: 2019-03-21
Attending: FAMILY MEDICINE
Payer: MEDICARE

## 2019-03-21 DIAGNOSIS — I71.30 RUPTURED ABDOMINAL AORTIC ANEURYSM (H): ICD-10-CM

## 2019-03-21 LAB
CREAT SERPL-MCNC: 0.9 MG/DL (ref 0.66–1.25)
GFR SERPL CREATININE-BSD FRML MDRD: 89 ML/MIN/{1.73_M2}

## 2019-03-21 PROCEDURE — 37000011 ZZH ANESTHESIA WARD SERVICE: Performed by: NURSE ANESTHETIST, CERTIFIED REGISTERED

## 2019-03-21 PROCEDURE — 25500064 ZZH RX 255 OP 636: Performed by: RADIOLOGY

## 2019-03-21 PROCEDURE — 82565 ASSAY OF CREATININE: CPT | Performed by: RADIOLOGY

## 2019-03-21 PROCEDURE — 74174 CTA ABD&PLVS W/CONTRAST: CPT | Mod: TC

## 2019-03-21 PROCEDURE — 36415 COLL VENOUS BLD VENIPUNCTURE: CPT | Performed by: RADIOLOGY

## 2019-03-21 RX ORDER — IOPAMIDOL 612 MG/ML
100 INJECTION, SOLUTION INTRAVASCULAR ONCE
Status: COMPLETED | OUTPATIENT
Start: 2019-03-21 | End: 2019-03-21

## 2019-03-21 RX ADMIN — IOPAMIDOL 100 ML: 612 INJECTION, SOLUTION INTRAVENOUS at 13:58

## 2019-04-20 ENCOUNTER — APPOINTMENT (OUTPATIENT)
Dept: CT IMAGING | Facility: HOSPITAL | Age: 64
End: 2019-04-20
Attending: FAMILY MEDICINE
Payer: MEDICARE

## 2019-04-20 ENCOUNTER — HOSPITAL ENCOUNTER (EMERGENCY)
Facility: HOSPITAL | Age: 64
Discharge: HOME | End: 2019-04-20
Attending: FAMILY MEDICINE | Admitting: FAMILY MEDICINE
Payer: MEDICARE

## 2019-04-20 ENCOUNTER — APPOINTMENT (OUTPATIENT)
Dept: GENERAL RADIOLOGY | Facility: HOSPITAL | Age: 64
End: 2019-04-20
Attending: FAMILY MEDICINE
Payer: MEDICARE

## 2019-04-20 VITALS
RESPIRATION RATE: 16 BRPM | TEMPERATURE: 97.2 F | SYSTOLIC BLOOD PRESSURE: 117 MMHG | HEART RATE: 80 BPM | OXYGEN SATURATION: 93 % | DIASTOLIC BLOOD PRESSURE: 86 MMHG

## 2019-04-20 DIAGNOSIS — I73.9 PAD (PERIPHERAL ARTERY DISEASE) (H): ICD-10-CM

## 2019-04-20 DIAGNOSIS — J44.1 COPD EXACERBATION (H): ICD-10-CM

## 2019-04-20 LAB
ALBUMIN SERPL-MCNC: 3.5 G/DL (ref 3.4–5)
ALP SERPL-CCNC: 79 U/L (ref 40–150)
ALT SERPL W P-5'-P-CCNC: 24 U/L (ref 0–70)
ANION GAP SERPL CALCULATED.3IONS-SCNC: 6 MMOL/L (ref 3–14)
AST SERPL W P-5'-P-CCNC: 14 U/L (ref 0–45)
BASOPHILS # BLD AUTO: 0 10E9/L (ref 0–0.2)
BASOPHILS NFR BLD AUTO: 0.5 %
BILIRUB SERPL-MCNC: 0.4 MG/DL (ref 0.2–1.3)
BUN SERPL-MCNC: 19 MG/DL (ref 7–30)
CALCIUM SERPL-MCNC: 8.8 MG/DL (ref 8.5–10.1)
CHLORIDE SERPL-SCNC: 102 MMOL/L (ref 94–109)
CO2 SERPL-SCNC: 31 MMOL/L (ref 20–32)
CREAT SERPL-MCNC: 0.8 MG/DL (ref 0.66–1.25)
DIFFERENTIAL METHOD BLD: ABNORMAL
EOSINOPHIL # BLD AUTO: 0.3 10E9/L (ref 0–0.7)
EOSINOPHIL NFR BLD AUTO: 5.2 %
ERYTHROCYTE [DISTWIDTH] IN BLOOD BY AUTOMATED COUNT: 13.6 % (ref 10–15)
GFR SERPL CREATININE-BSD FRML MDRD: >90 ML/MIN/{1.73_M2}
GLUCOSE SERPL-MCNC: 120 MG/DL (ref 70–99)
HCT VFR BLD AUTO: 42.4 % (ref 40–53)
HGB BLD-MCNC: 13.6 G/DL (ref 13.3–17.7)
IMM GRANULOCYTES # BLD: 0.1 10E9/L (ref 0–0.4)
IMM GRANULOCYTES NFR BLD: 0.9 %
LYMPHOCYTES # BLD AUTO: 1.7 10E9/L (ref 0.8–5.3)
LYMPHOCYTES NFR BLD AUTO: 29 %
MCH RBC QN AUTO: 33.3 PG (ref 26.5–33)
MCHC RBC AUTO-ENTMCNC: 32.1 G/DL (ref 31.5–36.5)
MCV RBC AUTO: 104 FL (ref 78–100)
MONOCYTES # BLD AUTO: 0.5 10E9/L (ref 0–1.3)
MONOCYTES NFR BLD AUTO: 8.7 %
NEUTROPHILS # BLD AUTO: 3.2 10E9/L (ref 1.6–8.3)
NEUTROPHILS NFR BLD AUTO: 55.7 %
NRBC # BLD AUTO: 0 10*3/UL
NRBC BLD AUTO-RTO: 0 /100
NT-PROBNP SERPL-MCNC: 201 PG/ML (ref 0–900)
PLATELET # BLD AUTO: 162 10E9/L (ref 150–450)
POTASSIUM SERPL-SCNC: 4 MMOL/L (ref 3.4–5.3)
PROT SERPL-MCNC: 7.5 G/DL (ref 6.8–8.8)
RBC # BLD AUTO: 4.09 10E12/L (ref 4.4–5.9)
SODIUM SERPL-SCNC: 139 MMOL/L (ref 133–144)
TROPONIN I SERPL-MCNC: <0.015 UG/L (ref 0–0.04)
WBC # BLD AUTO: 5.7 10E9/L (ref 4–11)

## 2019-04-20 PROCEDURE — 85025 COMPLETE CBC W/AUTO DIFF WBC: CPT | Performed by: FAMILY MEDICINE

## 2019-04-20 PROCEDURE — 93005 ELECTROCARDIOGRAM TRACING: CPT

## 2019-04-20 PROCEDURE — 25500064 ZZH RX 255 OP 636: Performed by: FAMILY MEDICINE

## 2019-04-20 PROCEDURE — 96375 TX/PRO/DX INJ NEW DRUG ADDON: CPT | Mod: XU

## 2019-04-20 PROCEDURE — 36415 COLL VENOUS BLD VENIPUNCTURE: CPT | Performed by: FAMILY MEDICINE

## 2019-04-20 PROCEDURE — 71045 X-RAY EXAM CHEST 1 VIEW: CPT | Mod: TC

## 2019-04-20 PROCEDURE — 25000128 H RX IP 250 OP 636: Performed by: FAMILY MEDICINE

## 2019-04-20 PROCEDURE — 84484 ASSAY OF TROPONIN QUANT: CPT | Performed by: FAMILY MEDICINE

## 2019-04-20 PROCEDURE — 93010 ELECTROCARDIOGRAM REPORT: CPT | Performed by: INTERNAL MEDICINE

## 2019-04-20 PROCEDURE — 80053 COMPREHEN METABOLIC PANEL: CPT | Performed by: FAMILY MEDICINE

## 2019-04-20 PROCEDURE — 99285 EMERGENCY DEPT VISIT HI MDM: CPT | Mod: 25

## 2019-04-20 PROCEDURE — 99285 EMERGENCY DEPT VISIT HI MDM: CPT | Mod: Z6 | Performed by: FAMILY MEDICINE

## 2019-04-20 PROCEDURE — 96374 THER/PROPH/DIAG INJ IV PUSH: CPT | Mod: XU

## 2019-04-20 PROCEDURE — 71275 CT ANGIOGRAPHY CHEST: CPT | Mod: TC

## 2019-04-20 PROCEDURE — 83880 ASSAY OF NATRIURETIC PEPTIDE: CPT | Performed by: FAMILY MEDICINE

## 2019-04-20 RX ORDER — METHYLPREDNISOLONE SODIUM SUCCINATE 125 MG/2ML
125 INJECTION, POWDER, LYOPHILIZED, FOR SOLUTION INTRAMUSCULAR; INTRAVENOUS ONCE
Status: COMPLETED | OUTPATIENT
Start: 2019-04-20 | End: 2019-04-20

## 2019-04-20 RX ORDER — PREDNISONE 20 MG/1
40 TABLET ORAL ONCE
Status: DISCONTINUED | OUTPATIENT
Start: 2019-04-20 | End: 2019-04-20

## 2019-04-20 RX ORDER — FUROSEMIDE 40 MG
40 TABLET ORAL DAILY
COMMUNITY

## 2019-04-20 RX ORDER — IOPAMIDOL 612 MG/ML
100 INJECTION, SOLUTION INTRAVASCULAR ONCE
Status: COMPLETED | OUTPATIENT
Start: 2019-04-20 | End: 2019-04-20

## 2019-04-20 RX ORDER — POTASSIUM CHLORIDE 1500 MG/1
20 TABLET, EXTENDED RELEASE ORAL
COMMUNITY

## 2019-04-20 RX ORDER — PREDNISONE 10 MG/1
TABLET ORAL
Qty: 32 TABLET | Refills: 0 | Status: ON HOLD | OUTPATIENT
Start: 2019-04-20 | End: 2019-08-16

## 2019-04-20 RX ORDER — METOPROLOL TARTRATE 100 MG
100 TABLET ORAL DAILY
COMMUNITY
End: 2019-04-20

## 2019-04-20 RX ORDER — METOPROLOL SUCCINATE 100 MG/1
100 TABLET, EXTENDED RELEASE ORAL DAILY
COMMUNITY

## 2019-04-20 RX ORDER — FUROSEMIDE 10 MG/ML
40 INJECTION INTRAMUSCULAR; INTRAVENOUS ONCE
Status: COMPLETED | OUTPATIENT
Start: 2019-04-20 | End: 2019-04-20

## 2019-04-20 RX ADMIN — METHYLPREDNISOLONE SODIUM SUCCINATE 125 MG: 125 INJECTION, POWDER, FOR SOLUTION INTRAMUSCULAR; INTRAVENOUS at 14:53

## 2019-04-20 RX ADMIN — IOPAMIDOL 75 ML: 612 INJECTION, SOLUTION INTRAVENOUS at 13:55

## 2019-04-20 RX ADMIN — FUROSEMIDE 40 MG: 10 INJECTION, SOLUTION INTRAMUSCULAR; INTRAVENOUS at 14:25

## 2019-04-20 NOTE — ED PROVIDER NOTES
"eMERGENCY dEPARTMENT eNCOUnter        CHIEF COMPLAINT    Chief Complaint   Patient presents with     Shortness of Breath     c/o shortness of breath, home 02 @ 4l n/c       HPI    Freddy JENNY Bermudez is a 63 year old male with history of oxygen dependent COPD who presents via EMS after sudden onset on shortness of breath this morning.  He states he was in the kitchen cooking breakfast when he suddenly became SOB.  No chest pain, no nausea, no other changes. Called EMS, when they arrived sats were 69%, responded rapidly to duoneb and was 97% on arrival on 4 L, which is his baseline.   He said he \"probably waited too long\" to go on oxygen.  Quit smoking 3 years ago.   His PMH significant for a ruptured AAA 6 months ago which was diagnosed here, he was transferred to [facility] and had successful repair.  His doctor has been out of town for the winter and he has an appointment with him in 3 days.   Freddy also says it's been really hard to get around.  He says he develops leg pain when he walks around and \"can't even go to [facility] anymore\".      REVIEW OF SYSTEMS    Cardiac: No palpitations, No syncope  Respiratory: +SOB  Neurologic: No syncope or LOC  GI: No Vomiting, No Diarrhea  General: No Fever, No Chills  All other systems reviewed and are negative.    PAST MEDICAL & SURGICAL HISTORY    Past Medical History:   Diagnosis Date     Hypertension      Sleep apnea     CPAP     Past Surgical History:   Procedure Laterality Date     APPENDECTOMY       CHOLECYSTECTOMY       ORTHOPEDIC SURGERY       PHACOEMULSIFICATION CLEAR CORNEA WITH TORIC INTRAOCULAR LENS IMPLANT  3/10/2014    Procedure: PHACOEMULSIFICATION CLEAR CORNEA WITH TORIC INTRAOCULAR LENS IMPLANT;  CATARACT EXTRACTION WITH INTRA OCULAR LENS RIGHT(TORIC);  Surgeon: David Garcia MD;  Location: [facility]     PHACOEMULSIFICATION CLEAR CORNEA WITH TORIC INTRAOCULAR LENS IMPLANT  3/25/2014    Procedure: PHACOEMULSIFICATION CLEAR CORNEA WITH TORIC INTRAOCULAR LENS IMPLANT;  " CATARACT EXTRACTION WITH INTRA OCULAR LENS LEFT TORIC;  Surgeon: David Garcia MD;  Location: [facility] OR       CURRENT MEDICATIONS    Current Outpatient Rx   Medication Sig Dispense Refill     furosemide (LASIX) 40 MG tablet Take 40 mg by mouth daily       metoprolol succinate ER (TOPROL-XL) 100 MG 24 hr tablet Take 100 mg by mouth daily       potassium chloride ER (K-TAB) 20 MEQ CR tablet Take 20 mEq by mouth       predniSONE (DELTASONE) 10 MG tablet Take 4 tablets daily for 5 days,  take 2 tablets daily for 3 days, take 1 tablet daily for 3 days, take half a tablet for 3 days.. 32 tablet 0     blood glucose monitoring (SHANIQUE CONTOUR) test strip Use to test blood sugar 1 times daily 100 each 10     blood glucose monitoring (SHANIQUE MICROLET) lancets Use to test blood sugar 1 times daily 100 each 10       ALLERGIES    No Known Allergies    SOCIAL & FAMILY HISTORY    Social History     Socioeconomic History     Marital status: Single     Spouse name: Not on file     Number of children: Not on file     Years of education: Not on file     Highest education level: Not on file   Occupational History     Not on file   Social Needs     Financial resource strain: Not on file     Food insecurity:     Worry: Not on file     Inability: Not on file     Transportation needs:     Medical: Not on file     Non-medical: Not on file   Tobacco Use     Smoking status: Former Smoker     Smokeless tobacco: Never Used   Substance and Sexual Activity     Alcohol use: Yes     Drug use: No     Sexual activity: Not on file   Lifestyle     Physical activity:     Days per week: Not on file     Minutes per session: Not on file     Stress: Not on file   Relationships     Social connections:     Talks on phone: Not on file     Gets together: Not on file     Attends Taoism service: Not on file     Active member of club or organization: Not on file     Attends meetings of clubs or organizations: Not on file     Relationship status: Not on file      Intimate partner violence:     Fear of current or ex partner: Not on file     Emotionally abused: Not on file     Physically abused: Not on file     Forced sexual activity: Not on file   Other Topics Concern     Parent/sibling w/ CABG, MI or angioplasty before 65F 55M? Not Asked   Social History Narrative     Not on file     No family history on file.    PHYSICAL EXAM    VITAL SIGNS: /86   Pulse 80   Temp 97.2  F (36.2  C) (Tympanic)   Resp 16   SpO2 93%    Constitutional: disheveled, heavily bearded.  he is alert.  HENT:  Atraumatic, dry mucus membranes  Neck: supple, no JVD   Respiratory:  Lungs distant breath sounds, no retractions   Cardiovascular:  regular rate, no murmurs  GI:  Soft, nontender, normal bowel sounds  Musculoskeletal: 1+ pitting  edema, no acute deformities  Integument:  Skin is warm and dry, no petechiae   Neurologic:  Alert & oriented, no slurred speech  Psych: Pleasant affect, no hallucinations    EKG    LVH    RADIOLOGY/PROCEDURES    Results for orders placed or performed during the hospital encounter of 04/20/19   XR Chest Port 1 View    Narrative    PROCEDURE:  XR CHEST PORT 1 VW    HISTORY:  short of breath.     COMPARISON:  2015    FINDINGS:   Heart is borderline in size. There is an irregular distribution of  pulmonary vascularity consistent with emphysema. There is widespread  interstitial thickening noted which has worsened as compared to 2015  No pleural effusion or pneumothorax.      Impression    IMPRESSION:  Severe emphysema. Widespread interstitial thickening  worsened from previous examination      THONG ANDREA MD   CTA Chest with Contrast    Narrative    PROCEDURE: CTA CHEST WITH CONTRAST 4/20/2019 2:09 PM    HISTORY: PE suspected, high pretest prob    COMPARISONS: 2017    Meds/Dose Given: Isovue 300, 75ml    TECHNIQUE: CT angiogram of the chest with sagittal coronal  reconstructions    FINDINGS: Severe emphysematous changes are noted in the lungs. There  is some  mild interstitial thickening. Dependent atelectasis is seen at  the lung bases. The heart is borderline in size. Heavy coronary artery  calcifications are noted. There is atherosclerotic plaquing in the  thoracic aorta. CT angiogram revealed no pulmonary emboli. There are  no hilar or mediastinal masses lymphadenopathy. Axillary and  supraclavicular lymph nodes appear normal. The upper portion of the  liver spleen and pancreas appear normal. Degenerative changes are  present in the thoracic spine.         Impression    IMPRESSION: No pulmonary emboli.  2. Severe emphysema.  3. Mild chronic interstitial thickening with dependent atelectasis at  the lung bases    THONG ANDREA MD         ED COURSE & MEDICAL DECISION MAKING    Pertinent Labs & Imaging studies reviewed and interpreted. (See chart for details)    See chart for details of medications given during the ED stay.    Vitals:    04/20/19 1423 04/20/19 1424 04/20/19 1428 04/20/19 1542   BP: 175/97   117/86   Pulse:    80   Resp:    16   Temp:       TempSrc:       SpO2: (!) 89% 93% 93% 93%         FINAL IMPRESSION    1. COPD exacerbation (H)        PLAN  Multiple issues for Freddy.  It doesn't appear he needs acute hospitalization, his sats have been stable here, CT negative for PE.  He has oxygen at home. He is also describing symptoms of peripheral artery disease.  He hasn't been to primary care in over 6 months.  We are doing social service referral, he has a friend who is giving him a ride home, he feels comfortable going home.  Have him return to ER if symptoms worsen.        Elda Bartholomew MD  04/20/19 4911

## 2019-04-20 NOTE — ED AVS SNAPSHOT
[facility]  [address]  [place] [address]  Phone:  [phone]                                    Freddy JENNY Bermudez   MRN: [id number]    Department:  [facility]   Date of Visit:  4/20/2019           After Visit Summary Signature Page    I have received my discharge instructions, and my questions have been answered. I have discussed any challenges I see with this plan with the nurse or doctor.    ..........................................................................................................................................  Patient/Patient Representative Signature      ..........................................................................................................................................  Patient Representative Print Name and Relationship to Patient    ..................................................               ................................................  Date                                   Time    ..........................................................................................................................................  Reviewed by Signature/Title    ...................................................              ..............................................  Date                                               Time          22EPIC Rev 08/18

## 2019-04-20 NOTE — ED NOTES
"Pt to ED room 4 by [facility] with c/o SOB that started while he was cooking breakfast today. Pt states that he was just \"standing there cooking\" when he became SOB. Pt states \"I dropped to the ground and wet myself.\" Pt denies hitting head. Pt denies chest pain, headache, dizziness, and recent illness. According to EMS, sats were 69% on arrival-pt was on home 4L at that time. Continuous neb given by EMS, which pt states helped him feel better. Pitting edema noted bilaterally to LE-pt states \"nobody knows why they are swollen.\" Pt has saw PCP on the fall. Pt also report having an abdominal aneurysm in November, which has been repaired.   "

## 2019-04-20 NOTE — ED NOTES
Discharge instructions reviewed with patient, and patient verbalized understanding. Pt awaiting friend for discharge.

## 2019-04-22 ENCOUNTER — TELEPHONE (OUTPATIENT)
Dept: CASE MANAGEMENT | Facility: HOSPITAL | Age: 64
End: 2019-04-22

## 2019-04-22 NOTE — TELEPHONE ENCOUNTER
----- Message from Galina Augustin RN sent at 4/20/2019  3:58 PM CDT -----  Pt in  ED for COPD exacerbation. Pt has a hard time caring for self at home. Pt states that he would be interested in possibly receiving home care services.    Thanks!  Galina

## 2019-04-23 ENCOUNTER — TELEPHONE (OUTPATIENT)
Dept: CASE MANAGEMENT | Facility: HOSPITAL | Age: 64
End: 2019-04-23

## 2019-04-24 ENCOUNTER — TELEPHONE (OUTPATIENT)
Dept: CASE MANAGEMENT | Facility: HOSPITAL | Age: 64
End: 2019-04-24

## 2019-08-16 ENCOUNTER — APPOINTMENT (OUTPATIENT)
Dept: CT IMAGING | Facility: HOSPITAL | Age: 64
DRG: 176 | End: 2019-08-16
Attending: FAMILY MEDICINE
Payer: MEDICARE

## 2019-08-16 ENCOUNTER — HOSPITAL ENCOUNTER (INPATIENT)
Facility: HOSPITAL | Age: 64
LOS: 2 days | Discharge: HOME | DRG: 176 | End: 2019-08-18
Attending: FAMILY MEDICINE | Admitting: HOSPITALIST
Payer: MEDICARE

## 2019-08-16 DIAGNOSIS — I26.99 OTHER PULMONARY EMBOLISM WITHOUT ACUTE COR PULMONALE, UNSPECIFIED CHRONICITY (H): Primary | ICD-10-CM

## 2019-08-16 DIAGNOSIS — I26.99 OTHER ACUTE PULMONARY EMBOLISM WITHOUT ACUTE COR PULMONALE (H): ICD-10-CM

## 2019-08-16 LAB
ALBUMIN SERPL-MCNC: 3.3 G/DL (ref 3.4–5)
ALP SERPL-CCNC: 76 U/L (ref 40–150)
ALT SERPL W P-5'-P-CCNC: 25 U/L (ref 0–70)
ANION GAP SERPL CALCULATED.3IONS-SCNC: 6 MMOL/L (ref 3–14)
AST SERPL W P-5'-P-CCNC: 15 U/L (ref 0–45)
BASOPHILS # BLD AUTO: 0 10E9/L (ref 0–0.2)
BASOPHILS NFR BLD AUTO: 0.4 %
BILIRUB SERPL-MCNC: 0.9 MG/DL (ref 0.2–1.3)
BUN SERPL-MCNC: 9 MG/DL (ref 7–30)
CALCIUM SERPL-MCNC: 8.7 MG/DL (ref 8.5–10.1)
CHLORIDE SERPL-SCNC: 101 MMOL/L (ref 94–109)
CO2 SERPL-SCNC: 31 MMOL/L (ref 20–32)
CREAT SERPL-MCNC: 0.66 MG/DL (ref 0.66–1.25)
D DIMER PPP DDU-MCNC: 286 NG/ML D-DU (ref 0–300)
DIFFERENTIAL METHOD BLD: ABNORMAL
EOSINOPHIL # BLD AUTO: 0.2 10E9/L (ref 0–0.7)
EOSINOPHIL NFR BLD AUTO: 2.2 %
ERYTHROCYTE [DISTWIDTH] IN BLOOD BY AUTOMATED COUNT: 14.3 % (ref 10–15)
GFR SERPL CREATININE-BSD FRML MDRD: >90 ML/MIN/{1.73_M2}
GLUCOSE BLDC GLUCOMTR-MCNC: 104 MG/DL (ref 70–99)
GLUCOSE SERPL-MCNC: 132 MG/DL (ref 70–99)
HCT VFR BLD AUTO: 41.4 % (ref 40–53)
HGB BLD-MCNC: 13.5 G/DL (ref 13.3–17.7)
IMM GRANULOCYTES # BLD: 0.1 10E9/L (ref 0–0.4)
IMM GRANULOCYTES NFR BLD: 0.6 %
LMWH PPP CHRO-ACNC: 0.79 IU/ML
LYMPHOCYTES # BLD AUTO: 1.2 10E9/L (ref 0.8–5.3)
LYMPHOCYTES NFR BLD AUTO: 14.6 %
MCH RBC QN AUTO: 32.4 PG (ref 26.5–33)
MCHC RBC AUTO-ENTMCNC: 32.6 G/DL (ref 31.5–36.5)
MCV RBC AUTO: 99 FL (ref 78–100)
MONOCYTES # BLD AUTO: 0.8 10E9/L (ref 0–1.3)
MONOCYTES NFR BLD AUTO: 9.8 %
NEUTROPHILS # BLD AUTO: 5.7 10E9/L (ref 1.6–8.3)
NEUTROPHILS NFR BLD AUTO: 72.4 %
NRBC # BLD AUTO: 0 10*3/UL
NRBC BLD AUTO-RTO: 0 /100
PLATELET # BLD AUTO: 152 10E9/L (ref 150–450)
POTASSIUM SERPL-SCNC: 4 MMOL/L (ref 3.4–5.3)
PROT SERPL-MCNC: 7.1 G/DL (ref 6.8–8.8)
RBC # BLD AUTO: 4.17 10E12/L (ref 4.4–5.9)
SODIUM SERPL-SCNC: 138 MMOL/L (ref 133–144)
TROPONIN I SERPL-MCNC: <0.015 UG/L (ref 0–0.04)
WBC # BLD AUTO: 7.9 10E9/L (ref 4–11)

## 2019-08-16 PROCEDURE — 25000132 ZZH RX MED GY IP 250 OP 250 PS 637: Mod: GY | Performed by: HOSPITALIST

## 2019-08-16 PROCEDURE — 25000132 ZZH RX MED GY IP 250 OP 250 PS 637: Mod: GY | Performed by: FAMILY MEDICINE

## 2019-08-16 PROCEDURE — 00000146 ZZHCL STATISTIC GLUCOSE BY METER IP

## 2019-08-16 PROCEDURE — 96375 TX/PRO/DX INJ NEW DRUG ADDON: CPT

## 2019-08-16 PROCEDURE — 84484 ASSAY OF TROPONIN QUANT: CPT | Performed by: FAMILY MEDICINE

## 2019-08-16 PROCEDURE — 80053 COMPREHEN METABOLIC PANEL: CPT | Performed by: FAMILY MEDICINE

## 2019-08-16 PROCEDURE — 36415 COLL VENOUS BLD VENIPUNCTURE: CPT | Performed by: HOSPITALIST

## 2019-08-16 PROCEDURE — 99285 EMERGENCY DEPT VISIT HI MDM: CPT | Mod: 25

## 2019-08-16 PROCEDURE — 85025 COMPLETE CBC W/AUTO DIFF WBC: CPT | Performed by: FAMILY MEDICINE

## 2019-08-16 PROCEDURE — 99285 EMERGENCY DEPT VISIT HI MDM: CPT | Mod: Z6 | Performed by: FAMILY MEDICINE

## 2019-08-16 PROCEDURE — 93010 ELECTROCARDIOGRAM REPORT: CPT | Performed by: INTERNAL MEDICINE

## 2019-08-16 PROCEDURE — 40000786 ZZHCL STATISTIC ACTIVE MRSA SURVEILLANCE CULTURE: Performed by: HOSPITALIST

## 2019-08-16 PROCEDURE — 99222 1ST HOSP IP/OBS MODERATE 55: CPT | Performed by: HOSPITALIST

## 2019-08-16 PROCEDURE — 96365 THER/PROPH/DIAG IV INF INIT: CPT

## 2019-08-16 PROCEDURE — 25500064 ZZH RX 255 OP 636: Performed by: RADIOLOGY

## 2019-08-16 PROCEDURE — 96376 TX/PRO/DX INJ SAME DRUG ADON: CPT

## 2019-08-16 PROCEDURE — 25000128 H RX IP 250 OP 636: Performed by: FAMILY MEDICINE

## 2019-08-16 PROCEDURE — 85520 HEPARIN ASSAY: CPT | Performed by: HOSPITALIST

## 2019-08-16 PROCEDURE — 12000000 ZZH R&B MED SURG/OB

## 2019-08-16 PROCEDURE — 71275 CT ANGIOGRAPHY CHEST: CPT | Mod: TC

## 2019-08-16 PROCEDURE — 85379 FIBRIN DEGRADATION QUANT: CPT | Performed by: FAMILY MEDICINE

## 2019-08-16 PROCEDURE — 93005 ELECTROCARDIOGRAM TRACING: CPT

## 2019-08-16 RX ORDER — DEXTROSE MONOHYDRATE 25 G/50ML
25-50 INJECTION, SOLUTION INTRAVENOUS
Status: DISCONTINUED | OUTPATIENT
Start: 2019-08-16 | End: 2019-08-18 | Stop reason: HOSPADM

## 2019-08-16 RX ORDER — ONDANSETRON 2 MG/ML
4 INJECTION INTRAMUSCULAR; INTRAVENOUS EVERY 6 HOURS PRN
Status: DISCONTINUED | OUTPATIENT
Start: 2019-08-16 | End: 2019-08-18 | Stop reason: HOSPADM

## 2019-08-16 RX ORDER — NITROGLYCERIN 0.4 MG/1
0.4 TABLET SUBLINGUAL
Status: COMPLETED | OUTPATIENT
Start: 2019-08-16 | End: 2019-08-16

## 2019-08-16 RX ORDER — POTASSIUM CL/LIDO/0.9 % NACL 10MEQ/0.1L
10 INTRAVENOUS SOLUTION, PIGGYBACK (ML) INTRAVENOUS
Status: DISCONTINUED | OUTPATIENT
Start: 2019-08-16 | End: 2019-08-17

## 2019-08-16 RX ORDER — MAGNESIUM SULFATE HEPTAHYDRATE 40 MG/ML
4 INJECTION, SOLUTION INTRAVENOUS EVERY 4 HOURS PRN
Status: DISCONTINUED | OUTPATIENT
Start: 2019-08-16 | End: 2019-08-18 | Stop reason: HOSPADM

## 2019-08-16 RX ORDER — POTASSIUM CHLORIDE 1500 MG/1
20-40 TABLET, EXTENDED RELEASE ORAL
Status: DISCONTINUED | OUTPATIENT
Start: 2019-08-16 | End: 2019-08-17

## 2019-08-16 RX ORDER — MULTIPLE VITAMINS W/ MINERALS TAB 9MG-400MCG
1 TAB ORAL DAILY
COMMUNITY

## 2019-08-16 RX ORDER — MORPHINE SULFATE 4 MG/ML
4 INJECTION, SOLUTION INTRAMUSCULAR; INTRAVENOUS
Status: DISCONTINUED | OUTPATIENT
Start: 2019-08-16 | End: 2019-08-16

## 2019-08-16 RX ORDER — ACETAMINOPHEN 325 MG/1
650 TABLET ORAL EVERY 4 HOURS PRN
Status: DISCONTINUED | OUTPATIENT
Start: 2019-08-16 | End: 2019-08-18 | Stop reason: HOSPADM

## 2019-08-16 RX ORDER — AMOXICILLIN 250 MG
2 CAPSULE ORAL 2 TIMES DAILY PRN
Status: DISCONTINUED | OUTPATIENT
Start: 2019-08-16 | End: 2019-08-18 | Stop reason: HOSPADM

## 2019-08-16 RX ORDER — NALOXONE HYDROCHLORIDE 0.4 MG/ML
.1-.4 INJECTION, SOLUTION INTRAMUSCULAR; INTRAVENOUS; SUBCUTANEOUS
Status: DISCONTINUED | OUTPATIENT
Start: 2019-08-16 | End: 2019-08-18 | Stop reason: HOSPADM

## 2019-08-16 RX ORDER — HYDROMORPHONE HYDROCHLORIDE 1 MG/ML
.3-.5 INJECTION, SOLUTION INTRAMUSCULAR; INTRAVENOUS; SUBCUTANEOUS
Status: DISCONTINUED | OUTPATIENT
Start: 2019-08-16 | End: 2019-08-18 | Stop reason: HOSPADM

## 2019-08-16 RX ORDER — ASPIRIN 81 MG/1
324 TABLET, CHEWABLE ORAL ONCE
Status: COMPLETED | OUTPATIENT
Start: 2019-08-16 | End: 2019-08-16

## 2019-08-16 RX ORDER — HEPARIN SODIUM 10000 [USP'U]/100ML
0-3500 INJECTION, SOLUTION INTRAVENOUS CONTINUOUS
Status: DISCONTINUED | OUTPATIENT
Start: 2019-08-16 | End: 2019-08-17

## 2019-08-16 RX ORDER — POTASSIUM CHLORIDE 1.5 G/1.58G
20-40 POWDER, FOR SOLUTION ORAL
Status: DISCONTINUED | OUTPATIENT
Start: 2019-08-16 | End: 2019-08-17

## 2019-08-16 RX ORDER — POTASSIUM CHLORIDE 7.45 MG/ML
10 INJECTION INTRAVENOUS
Status: DISCONTINUED | OUTPATIENT
Start: 2019-08-16 | End: 2019-08-17

## 2019-08-16 RX ORDER — LIDOCAINE 40 MG/G
CREAM TOPICAL
Status: DISCONTINUED | OUTPATIENT
Start: 2019-08-16 | End: 2019-08-18 | Stop reason: HOSPADM

## 2019-08-16 RX ORDER — NICOTINE POLACRILEX 4 MG
15-30 LOZENGE BUCCAL
Status: DISCONTINUED | OUTPATIENT
Start: 2019-08-16 | End: 2019-08-18 | Stop reason: HOSPADM

## 2019-08-16 RX ORDER — OXYCODONE HYDROCHLORIDE 5 MG/1
5-10 TABLET ORAL
Status: DISCONTINUED | OUTPATIENT
Start: 2019-08-16 | End: 2019-08-18 | Stop reason: HOSPADM

## 2019-08-16 RX ORDER — ONDANSETRON 4 MG/1
4 TABLET, ORALLY DISINTEGRATING ORAL EVERY 6 HOURS PRN
Status: DISCONTINUED | OUTPATIENT
Start: 2019-08-16 | End: 2019-08-18 | Stop reason: HOSPADM

## 2019-08-16 RX ORDER — FUROSEMIDE 40 MG
40 TABLET ORAL DAILY
Status: DISCONTINUED | OUTPATIENT
Start: 2019-08-16 | End: 2019-08-18 | Stop reason: HOSPADM

## 2019-08-16 RX ORDER — POLYETHYLENE GLYCOL 3350 17 G/17G
17 POWDER, FOR SOLUTION ORAL DAILY PRN
Status: DISCONTINUED | OUTPATIENT
Start: 2019-08-16 | End: 2019-08-18 | Stop reason: HOSPADM

## 2019-08-16 RX ORDER — METOPROLOL SUCCINATE 100 MG/1
100 TABLET, EXTENDED RELEASE ORAL DAILY
Status: DISCONTINUED | OUTPATIENT
Start: 2019-08-16 | End: 2019-08-18 | Stop reason: HOSPADM

## 2019-08-16 RX ORDER — AMOXICILLIN 250 MG
1 CAPSULE ORAL 2 TIMES DAILY PRN
Status: DISCONTINUED | OUTPATIENT
Start: 2019-08-16 | End: 2019-08-18 | Stop reason: HOSPADM

## 2019-08-16 RX ADMIN — METOPROLOL SUCCINATE 100 MG: 100 TABLET, FILM COATED, EXTENDED RELEASE ORAL at 18:45

## 2019-08-16 RX ADMIN — NITROGLYCERIN 0.4 MG: 0.4 TABLET SUBLINGUAL at 13:45

## 2019-08-16 RX ADMIN — IOHEXOL 75 ML: 350 INJECTION, SOLUTION INTRAVENOUS at 14:24

## 2019-08-16 RX ADMIN — MORPHINE SULFATE 4 MG: 4 INJECTION, SOLUTION INTRAMUSCULAR; INTRAVENOUS at 13:45

## 2019-08-16 RX ADMIN — HEPARIN SODIUM 1800 UNITS/HR: 10000 INJECTION, SOLUTION INTRAVENOUS at 16:08

## 2019-08-16 RX ADMIN — ASPIRIN 81 MG 324 MG: 81 TABLET ORAL at 12:59

## 2019-08-16 RX ADMIN — FUROSEMIDE 40 MG: 40 TABLET ORAL at 18:45

## 2019-08-16 ASSESSMENT — ACTIVITIES OF DAILY LIVING (ADL): ADLS_ACUITY_SCORE: 15

## 2019-08-16 ASSESSMENT — ENCOUNTER SYMPTOMS
WHEEZING: 0
PALPITATIONS: 0
DYSURIA: 0
CHILLS: 0
NECK PAIN: 1
DIZZINESS: 0
FEVER: 0
DIARRHEA: 0
COUGH: 0
VOMITING: 0
SHORTNESS OF BREATH: 1
BACK PAIN: 1
NAUSEA: 0
BRUISES/BLEEDS EASILY: 0
ABDOMINAL PAIN: 0
SORE THROAT: 0

## 2019-08-16 NOTE — ED NOTES
"Pt here with friend Ramos for chest pain. Pain began last evening and is in the R anterior chest, is aching in nature. Pain does not radiate. Pt states that there are no alleviating or aggravating factors for pain. Pt states that it was very difficult for him to get comfortable while trying to sleep last night. Woke this morning and felt a bit better, but then began feeling worse as the day went on so he had friend transport him here. Pt has taken no medications for the pain. States he takes 2 home medications and is unsure of the names or dosages of either. PCP Dr. Gasca, [facility] contacted and requested to fax pt's current med list. Pt states he had a fall last week at home, large scab is noted to RLE which pt states is from his fall. Pt denies any changes in his breathing or any other symptoms, is on 4 LPM O2 at home and states that he wears it \"most of the time.\"   "

## 2019-08-16 NOTE — ED PROVIDER NOTES
"  History     Chief Complaint   Patient presents with     Chest Pain     HPI  Freddy Bermudez is a 63 year old male who has a history of PE (2012 x 1, no longer on anticoagulation), home O2 (12/2018 4 lpm), AAA (enodvascular repair 11/18--ruptured, [facility]), polycythemia (Hb 18),  hypertension and sleep apnea, bilateral corneal lens implants (2014). He says he was told he had a heart attack when his left lung collapsed. He says he had a chest tube placed at [place] 2014.  Patient didn't know it but son told him that the doctors told him he did.  Since his AAA repair, he's had weak legs.     Chest pain: Started yesterday on the right side of her chest at around midnight. He was fine all day long. He was awake at the time. The pain came on slowly. No radiation to neck, back or down arm. No numbness or tingling in his fingers. He has had no change in his breathing since midnight.  \"I had that embolism in 2012 which felt a lot like that.\"  The pain is constant ache and sometimes it's stabbing. Aching pain is 7/10 up to 9/10. He took nothing for it at home. Doesn't have ntg at home.  It got a little less than am and got worse when he was in the shower.  He got out of the shower early because of pain.     He thought his breathing was getting worse and needing more O2. He's been on 4lmpm since December 1.  Asked for increase in June/July but it wasn't. Feels better currently.  \"I feel like crap every morning and as the day goes on I can breath better.  O2 is 89% in the morning--goes to 93% during the day.     CTA chest  4/19 no pulmonary emboli, severe emphysema, mild chronic interstitial thickening with dependent atelectasis at the lung bases.     Admission 11/25-12/1/19: AAA rupture and endovascular repair.    Freddy Bermudez is a 63 year old male presented to [facility] with sever abdominal pain. CT scan suggested ruptured AAA and he was transferred her. Her urgently underwent endovascular repair which he has tolerated. Pre " "operatively it was noted that he was hypoxic, polycythemic Hgb 18 and had clubbing of his fingers. Post operatively he required NC oxygen to maintain his oxygen sats > 90%. His CT of chest did not show any thrombo embolic disease but did show sever emphysema. His echocardiogram showed normal LV function. I believe that patient has qualified for oxygen for some time and we ordered it for him. I will order out patient pulmonary function test and PFT. He was hypertensive in the hospital and I started Metoprolol. He also had 3 plus edema in bilateral legs which were initially treated with iv lasix ( diuresed 10 L ). He will be discharged on oral lasix and potassium. He should get a potassium check in one week.   He did have mildly elevated sugars while here ( with one 211). He may have the beginning if DM type II. He should follow up with his primary docto about this also and consider a Hgb A1C . A copy of this note will be given to the patient and sent to his primary doctor       Social history  8/19: Freddy lives alone in his home. He has 2 sons: one in [place] and one in the [facility]. He drives \"but not much.\" Uses cane if short distance and if longer a walker, but within the house nothing. Groceries--friend gets them for him--Ramos.     Allergies:  No Known Allergies    Problem List:    Patient Active Problem List    Diagnosis Date Noted     Diabetes mellitus type 2, diet-controlled (H) 12/27/2018     Priority: Medium     Hypoxia 03/04/2015     Priority: Medium     COPD with acute bronchitis (H) 03/04/2015     Priority: Medium     NSTEMI (non-ST elevated myocardial infarction) (H) 11/09/2014     Priority: Medium        Past Medical History:    Past Medical History:   Diagnosis Date     Hypertension      Sleep apnea        Past Surgical History:    Past Surgical History:   Procedure Laterality Date     APPENDECTOMY       CHOLECYSTECTOMY       ORTHOPEDIC SURGERY       PHACOEMULSIFICATION CLEAR CORNEA WITH TORIC " "INTRAOCULAR LENS IMPLANT  3/10/2014    Procedure: PHACOEMULSIFICATION CLEAR CORNEA WITH TORIC INTRAOCULAR LENS IMPLANT;  CATARACT EXTRACTION WITH INTRA OCULAR LENS RIGHT(TORIC);  Surgeon: David Garcia MD;  Location: [facility]     PHACOEMULSIFICATION CLEAR CORNEA WITH TORIC INTRAOCULAR LENS IMPLANT  3/25/2014    Procedure: PHACOEMULSIFICATION CLEAR CORNEA WITH TORIC INTRAOCULAR LENS IMPLANT;  CATARACT EXTRACTION WITH INTRA OCULAR LENS LEFT TORIC;  Surgeon: David Garcia MD;  Location: [facility] OR       Family History:    No family history on file.    Social History:  Marital Status:  Single [1]  Social History     Tobacco Use     Smoking status: Former Smoker     Smokeless tobacco: Never Used   Substance Use Topics     Alcohol use: Yes     Drug use: No        Medications:      furosemide (LASIX) 40 MG tablet   potassium chloride ER (K-TAB) 20 MEQ CR tablet   blood glucose monitoring (SHANIQUE CONTOUR) test strip   blood glucose monitoring ([employer] MICROLET) lancets   metoprolol succinate ER (TOPROL-XL) 100 MG 24 hr tablet         Review of Systems   Constitutional: Negative for chills and fever.   HENT: Positive for congestion. Negative for ear pain and sore throat.         Sinus is always plugged. \"It's better.\"    Respiratory: Positive for shortness of breath. Negative for cough and wheezing.    Cardiovascular: Positive for chest pain. Negative for palpitations.   Gastrointestinal: Negative for abdominal pain, diarrhea, nausea and vomiting.   Genitourinary: Negative for dysuria.   Musculoskeletal: Positive for back pain (ongoing) and neck pain (ongoing).   Skin: Negative for rash.   Neurological: Negative for dizziness.   Hematological: Does not bruise/bleed easily.   Psychiatric/Behavioral:        No depression or anxiety.       Physical Exam   BP: 155/77  Pulse: 93  Heart Rate: 81  Temp: 99.8  F (37.7  C)  Resp: 14  SpO2: (!) 89 %      Physical Exam   Constitutional: He is oriented to person, place, and time. He appears " well-developed. No distress.   O2 in place   HENT:   Head: Normocephalic and atraumatic.   Eyes: Pupils are equal, round, and reactive to light.   Neck: Neck supple.   Cardiovascular: Normal rate, regular rhythm and normal heart sounds. Exam reveals no gallop and no friction rub.   No murmur heard.  Pulmonary/Chest: Effort normal and breath sounds normal. No respiratory distress.   Abdominal: There is no tenderness. There is no rebound and no guarding.   Morbid obesity   Musculoskeletal: He exhibits no edema.   Lymphadenopathy:     He has no cervical adenopathy.   Neurological: He is alert and oriented to person, place, and time.   Skin: Skin is warm and dry.   Venous stasis bilaterally, eschar on right shin   Psychiatric: He has a normal mood and affect.   Nursing note and vitals reviewed.      ED Course        Procedures              Patient Vitals for the past 24 hrs:   BP Temp Temp src Pulse Heart Rate Resp SpO2   08/16/19 1515 115/72 -- -- -- 75 20 94 %   08/16/19 1500 98/56 -- -- -- 78 18 (!) 91 %   08/16/19 1445 94/63 -- -- -- 76 19 (!) 91 %   08/16/19 1415 110/72 -- -- -- 90 19 (!) 89 %   08/16/19 1400 97/56 -- -- -- 77 12 94 %   08/16/19 1345 122/65 -- -- -- 75 15 93 %   08/16/19 1315 143/71 -- -- -- 81 19 93 %   08/16/19 1300 146/94 -- -- -- -- 14 93 %   08/16/19 1241 155/77 99.8  F (37.7  C) Tympanic 93 -- 14 (!) 89 %       LABORATORY (REVIEWED AND INTERPRETED):  CBC BMP Liver Panel   Recent Labs   Lab Test 08/16/19  1259   WBC 7.9   HGB 13.5       Recent Labs   Lab Test 08/16/19  1259   POTASSIUM 4.0   CHLORIDE 101   BUN 9    Recent Labs   Lab Test 08/16/19  1259   BILITOTAL 0.9   ALT 25   AST 15        UA     DIP MICRO   No lab results found.    Invalid input(s): SPECGAV, GLUCONSUA, KETONESUA, BLOODUA, LEUKOCYTE Invalid input(s): RBCUA, WBCUA, BACTERIAUA, EPITHELIALUA       OTHER LABS   Recent Labs   Lab Test 03/04/15  0600   INR 1.08            INTERVENTIONS:  Medications   morphine (PF)  injection 4 mg (4 mg Intravenous Given 8/16/19 1345)   heparin Loading Dose from infusion pump *Give when STARTING heparin infusion 8,000 Units (has no administration in time range)   heparin  drip 25,000 units in 0.45% NaCl 250 mL (see additional administration details for dose) (has no administration in time range)   heparin bolus from infusion pump (has no administration in time range)   aspirin (ASA) chewable tablet 324 mg (324 mg Oral Given 8/16/19 1259)   nitroGLYcerin (NITROSTAT) sublingual tablet 0.4 mg (0.4 mg Sublingual Given 8/16/19 1345)   sodium chloride (PF) 0.9% PF flush 100 mL (100 mLs Intravenous Given 8/16/19 1424)   iohexol (OMNIPAQUE) 350 mg/mL solution 75 mL (75 mLs Intravenous Given 8/16/19 1424)       ECG (Reviewed and Interpreted by me):  LAD, non specific interventricular block. Nonspecific T wave abnormality.     IMAGING (Reviewed and Interpreted by me):  IMPRESSION:  Good quality examination positive for pulmonary emboli with small filling defects within the subsegmental arterial branches of the lower lobes.     ED COURSE:  12:49 PM The patient has been seen and evaluated by me.  I have reviewed the medical records.    3:14 PM I spoke to Dr. Resendez Pt has subsegmental PE.  Ddimer 286ng/ml    3:34 PM I spoke to Dr. Chong at [facility] because he already has lung disease and clot burden will worsen his condition--recommend treatment.      3:35 PM I spoke to Dr. Lopez, pulmonology. Treat with heparin.     3:53 PM he is started on heparin and spoke to Dr. Resendez.     IMPRESSIONS AND PLAN:  Pulmonary emboli: Freddy has a history of AAA repair after rupture 11/18. He has had a NSTEMI but no visible anigogram in EMR. He has had chest pain for the past 12 hours prior to arrival--starting at midnight.  The pain is on the right side of his chest.  He says it similar to when he had his pulmonary embolism back in 2012.  He is no longer on anticoagulation.  He does not know when or why it was stopped.  EKG  was sinus arrhythmia, left axis deviation, QRS of 128 ms with nonspecific intraventricular block.  D-dimer was 286.  Troponin is negative.  CT scan demonstrated     IMPRESSION:  Good quality examination positive for pulmonary emboli with small filling defects within the subsegmental arterial branches of the lower lobes.     I spoke with Dr. Resendez, hospitalist who asked that I call and speak to pulmonology at [facility].  I spoke to Dr. Lopez who recommends treating with heparin.       DIAGNOSIS:    ICD-10-CM    1. Other acute pulmonary embolism without acute cor pulmonale (H) I26.99        DISCHARGE MEDICATIONS:  New Prescriptions    No medications on file         LOS:  4      8/16/2019  [facility]    Umer Gasca Cassandra E, MD  08/16/19 [id number]

## 2019-08-16 NOTE — ED NOTES
Med list rec'd from [facility]. Med list has various meds on it but pt states he is only taking 2 pills, no inhalers, and no nebulizers. Contacted pt's pharmacy to verify, per pharmacy, pt has been filling Lasix and Potassium. Med list reconciled to reflect.

## 2019-08-17 ENCOUNTER — APPOINTMENT (OUTPATIENT)
Dept: ULTRASOUND IMAGING | Facility: HOSPITAL | Age: 64
DRG: 176 | End: 2019-08-17
Attending: HOSPITALIST
Payer: MEDICARE

## 2019-08-17 LAB
ANION GAP SERPL CALCULATED.3IONS-SCNC: 5 MMOL/L (ref 3–14)
BUN SERPL-MCNC: 14 MG/DL (ref 7–30)
CALCIUM SERPL-MCNC: 8.6 MG/DL (ref 8.5–10.1)
CHLORIDE SERPL-SCNC: 104 MMOL/L (ref 94–109)
CO2 SERPL-SCNC: 32 MMOL/L (ref 20–32)
CREAT SERPL-MCNC: 0.67 MG/DL (ref 0.66–1.25)
GFR SERPL CREATININE-BSD FRML MDRD: >90 ML/MIN/{1.73_M2}
GLUCOSE BLDC GLUCOMTR-MCNC: 105 MG/DL (ref 70–99)
GLUCOSE BLDC GLUCOMTR-MCNC: 107 MG/DL (ref 70–99)
GLUCOSE BLDC GLUCOMTR-MCNC: 121 MG/DL (ref 70–99)
GLUCOSE BLDC GLUCOMTR-MCNC: 142 MG/DL (ref 70–99)
GLUCOSE SERPL-MCNC: 115 MG/DL (ref 70–99)
LMWH PPP CHRO-ACNC: 0.42 IU/ML
LMWH PPP CHRO-ACNC: 0.54 IU/ML
MAGNESIUM SERPL-MCNC: 2.2 MG/DL (ref 1.6–2.3)
MAGNESIUM SERPL-MCNC: 2.3 MG/DL (ref 1.6–2.3)
NT-PROBNP SERPL-MCNC: 327 PG/ML (ref 0–900)
POTASSIUM SERPL-SCNC: 3.8 MMOL/L (ref 3.4–5.3)
POTASSIUM SERPL-SCNC: 4.2 MMOL/L (ref 3.4–5.3)
SODIUM SERPL-SCNC: 141 MMOL/L (ref 133–144)
TROPONIN I SERPL-MCNC: <0.015 UG/L (ref 0–0.04)
TROPONIN I SERPL-MCNC: <0.015 UG/L (ref 0–0.04)

## 2019-08-17 PROCEDURE — 25000128 H RX IP 250 OP 636

## 2019-08-17 PROCEDURE — 80048 BASIC METABOLIC PNL TOTAL CA: CPT | Performed by: HOSPITALIST

## 2019-08-17 PROCEDURE — 12000000 ZZH R&B MED SURG/OB

## 2019-08-17 PROCEDURE — 84484 ASSAY OF TROPONIN QUANT: CPT | Performed by: HOSPITALIST

## 2019-08-17 PROCEDURE — 25000128 H RX IP 250 OP 636: Performed by: FAMILY MEDICINE

## 2019-08-17 PROCEDURE — 93005 ELECTROCARDIOGRAM TRACING: CPT

## 2019-08-17 PROCEDURE — 25000132 ZZH RX MED GY IP 250 OP 250 PS 637: Mod: GY | Performed by: INTERNAL MEDICINE

## 2019-08-17 PROCEDURE — 25000132 ZZH RX MED GY IP 250 OP 250 PS 637: Mod: GY | Performed by: HOSPITALIST

## 2019-08-17 PROCEDURE — 99233 SBSQ HOSP IP/OBS HIGH 50: CPT | Performed by: INTERNAL MEDICINE

## 2019-08-17 PROCEDURE — 36415 COLL VENOUS BLD VENIPUNCTURE: CPT | Performed by: HOSPITALIST

## 2019-08-17 PROCEDURE — 93010 ELECTROCARDIOGRAM REPORT: CPT | Performed by: INTERNAL MEDICINE

## 2019-08-17 PROCEDURE — 84484 ASSAY OF TROPONIN QUANT: CPT | Performed by: INTERNAL MEDICINE

## 2019-08-17 PROCEDURE — 83735 ASSAY OF MAGNESIUM: CPT | Performed by: HOSPITALIST

## 2019-08-17 PROCEDURE — 83880 ASSAY OF NATRIURETIC PEPTIDE: CPT | Performed by: HOSPITALIST

## 2019-08-17 PROCEDURE — 36415 COLL VENOUS BLD VENIPUNCTURE: CPT | Performed by: INTERNAL MEDICINE

## 2019-08-17 PROCEDURE — 94664 DEMO&/EVAL PT USE INHALER: CPT

## 2019-08-17 PROCEDURE — 84132 ASSAY OF SERUM POTASSIUM: CPT | Performed by: HOSPITALIST

## 2019-08-17 PROCEDURE — 40000275 ZZH STATISTIC RCP TIME EA 10 MIN

## 2019-08-17 PROCEDURE — 93970 EXTREMITY STUDY: CPT | Mod: TC

## 2019-08-17 PROCEDURE — 94640 AIRWAY INHALATION TREATMENT: CPT

## 2019-08-17 PROCEDURE — 85520 HEPARIN ASSAY: CPT | Performed by: HOSPITALIST

## 2019-08-17 PROCEDURE — 00000146 ZZHCL STATISTIC GLUCOSE BY METER IP

## 2019-08-17 RX ADMIN — RIVAROXABAN 15 MG: 15 TABLET, FILM COATED ORAL at 18:05

## 2019-08-17 RX ADMIN — FLUTICASONE FUROATE AND VILANTEROL TRIFENATATE 1 PUFF: 100; 25 POWDER RESPIRATORY (INHALATION) at 10:10

## 2019-08-17 RX ADMIN — UMECLIDINIUM 1 PUFF: 62.5 AEROSOL, POWDER ORAL at 10:10

## 2019-08-17 RX ADMIN — FUROSEMIDE 40 MG: 40 TABLET ORAL at 09:10

## 2019-08-17 RX ADMIN — HEPARIN SODIUM 1700 UNITS/HR: 10000 INJECTION, SOLUTION INTRAVENOUS at 05:37

## 2019-08-17 RX ADMIN — HEPARIN SODIUM 1700 UNITS/HR: 10000 INJECTION, SOLUTION INTRAVENOUS at 02:47

## 2019-08-17 RX ADMIN — RIVAROXABAN 15 MG: 15 TABLET, FILM COATED ORAL at 10:07

## 2019-08-17 ASSESSMENT — ACTIVITIES OF DAILY LIVING (ADL)
ADLS_ACUITY_SCORE: 16
ADLS_ACUITY_SCORE: 15
ADLS_ACUITY_SCORE: 13
ADLS_ACUITY_SCORE: 16
ADLS_ACUITY_SCORE: 15
ADLS_ACUITY_SCORE: 15

## 2019-08-17 NOTE — PROGRESS NOTES
KAILASH TOBAR  Patient visit during  rounds. Patient alert and talkative. Patient had no spiritual care requests at this time.

## 2019-08-17 NOTE — PLAN OF CARE
Heparin drip stopped at this time per order, IV flushed and locked, patient is receiving the ultrasound at present.

## 2019-08-17 NOTE — PLAN OF CARE
Pt alert and orientated. Ate well for supper.   Telemetry on- SR 70.  IV infusion- heparin infusing per protocol.  Lab drawn 6hr after start..  Voiding  good amount.  Edema noticed  lower extremities.  skin stasis changes lower legs with small scab on right shin.  Uses call light.

## 2019-08-17 NOTE — PLAN OF CARE
Patient has been asymptomatic with the irregular heart rhythms, troponin came back at less than 0.015.

## 2019-08-17 NOTE — PLAN OF CARE
O2 sats 87-90%.   Increased O2 to 4L  sats 90%. Ate well for supper before accu check. Will check at [facility].

## 2019-08-17 NOTE — PROGRESS NOTES
Assessment completed see flowsheet.    LOC: alert   Others present: Patient     Dx: Pleuritic chest pain secondary to recurrent PE  Chronic Disease Management: states he has seen Dr. Serrano of [facility] every six months, has seen dentist, Dr. Trejo of [place], eye doctor at [facility] in [place].    Lives with: alone  Living at:  House   Transportation: YES - patient drives. Friend to transport on hospital discharge.    Primary PCP: Umer Gasca  Insurance:  Medicare & BCBS  Medicare IM letter reviewed with patient.    Support System:  Friends,siblings   Homecare/PCA: no  /[facility] Services: no- though he states that when he discharged the hospital in [place] previously, he received a call from a [facility]  offering to help with any needs and he didn't have any needs at that time. He states she gave him her contact info if any needs arise and he is planning to give her a call to ask about possible services for house cleaning and laundry.  : NO      [facility] Referral line called: N/A    Health Care Directive: NO - patient accepted the information and agrees to provide a copy once it's completed  Guardian: no  POA: no    [facility]  Meds management: YES - states he takes his medications as ordered but doesn't completely understand all of his medications. He plans to talk to his nurse today to discuss and also possibly talk to the pharmacist the next time he refills his meds so he can understand them better.     Adequate Resources for needs (housing, utilities, food/med): YES  Household chores: His friend, Ramos has been helping him with cleaning and laundry. He hires someone to do his yard work.   Work/community/social activity: YES - retired from the [employer] of [place].     ADLs: independent  Ambulation:independent- uses a walker with seat when going long distances and a cane if shorter distances out of the house. States he doesn't use  assistive devices in the house.   Falls: states two falls in the past six months. He states one fall happened when he tripped and scraped his leg but no other injuries. He states the other fall was caused by a loss of balance but denies any injuries from that fall. He states he started using the walker and cane when out of the house after those falls to prevent future falls. He also states he has a narrow stairway to the basement that curves, which makes him nervous to go down. He states it has a railing on one side. He states he is no longer using those stairs, as his friend Ramos does the laundry and that's the only thing he needed to use those stairs for.   Nutrition: states he is following a low sodium/borderline diabetic diet and does his own food prep. States he mostly avoids convenience foods.  Sleep: states he gets adequate sleep overall but sometimes has a hard time falling back asleep when he wakes up d/t his diuretic and having to urinate. He states he takes his diuretic early in the day. He states he will talk to his PCP if he ever feels he's not getting adequate sleep in the future.     Equipment used: cane, walker, home oxygen, CPAP- though he states his is old and plans to talk to PCP about getting a new one. He hasn't been using it at home and states he's been using his oxygen at night instead, as the CPAP mask doesn't seem to fit well anymore.       [facility]       Does the supplier have valid oxygen orders: will confirm when they are available Monday.     Mental health: denies mood concerns.  Substance abuse: denies tobacco use. States he drinks 1-2 glasses of wine daily. Denies any other drug use.   Exposure to violence/abuse: denies  Stressors: denies except as above (cleaning and laundry)     Able to Return to Prior Living Arrangements: YES    Choice of Vendor: N/A    Barriers: None identified     BEN: Average     Plan: Return home, transport by friend.

## 2019-08-17 NOTE — PLAN OF CARE
Upon general assessment, patient alert oriented and reported his pain was at a minimum, the heparin drip was infusing, oral xarelto was administered, and subsequently the heparin drip was stopped, patient has been up in the chair today, patient did have his lower extremity ultrasound and tolerated that well, patient does ramon down to the higher 40's to 50's, and metoprolol was held, per ICU, patient did have a 20 beat run of V tach, with inverted T waves, MD was updated and an EKG was done,  and troponin was obtained, patient has been asymptomatic with this situation, however, patient does appear to have sleep apnea, falls asleep easily, and immediately snores, patient has required his supplemental oxygen at 4 liters via nasal cannula. Patient is alert and oriented, compliant and does make his needs known.

## 2019-08-17 NOTE — PROGRESS NOTES
Pt noted to have several episodes of HR-40's with trigeminy. Primary RN notified. Dr. Galindo updated via text page.

## 2019-08-17 NOTE — PROVIDER NOTIFICATION
Dr. Reilly notified of approx 20 beat run of v tach, pt sleeping at the time but asymptomatic when woken up. Also notified  Of new inverted T waves starting on night shift. Orders for EKG and troponin. Notified primary nurse as well.

## 2019-08-17 NOTE — PLAN OF CARE
[facility] Inpatient Admission Note:    Patient admitted to 3226/3226-12 at approximately 1632 via cart accompanied by transport tech from emergency room . Report received from brittny easton in SBAR format at 1621 via telephone. Patient ambulated to bed via self.. Patient is alert and oriented X 3, denies pain; rates at 0 on 0-10 scale.  Patient oriented to room, unit, hourly rounding, and plan of care. Explained admission packet and patient handbook with patient bill of rights brochure. Will continue to monitor and document as needed.     Inpatient Nursing criteria listed below was met:    Health care directives status obtained and documented: Yes    Care Everywhere authorization obtained No    MRSA swab completed for patient 65 years and older: Yes    Patient identifies a surrogate decision maker: No If yes, who:na Contact Information:see facesheet    Core Measure diagnosis present:No.      If initial lactic acid >2.0, repeat lactic acid drawn within one hour of arrival to unit: NA. If no, state reason: na    Vaccination assessment and education completed: Yes   Vaccinations received prior to admission: Pneumovax yes  Influenza(seasonal)  YES   Vaccination(s) ordered: patient declines    Clergy visit ordered if patient requests: N/A    Skin issues/needs documented: N/A    Isolation Patient: no Education given, correct sign in place and documentation row added to PCS:  No    Fall Prevention Yes: Care plan updated, education given and documented, sticker and magnet in place: Yes    Care Plan initiated: Yes    Education Documented (including assessment): Yes    Patient has discharge needs : No If yes, please explain:na

## 2019-08-17 NOTE — PLAN OF CARE
xarelto given, patient up in the chair, heart rate is 59, oxygen saturations are 90% on 4 liters via nasal cannula.

## 2019-08-17 NOTE — PROGRESS NOTES
[facility]    Medicine Progress Note - Hospitalist Service       Date of Admission:  8/16/2019  Assessment & Plan      Freddygiovanni Bermudez is a 63 year old male admitted on 8/16/2019. He is presenting with acute PE     1. Pleuritic chest pain secondary to recurrent PE; CT PE study showing  Good quality examination positive for pulmonary emboli with small filling defects within the subsegmental arterial branches of the lower lobes; he was started on heparin infusion and admitted for further evaluation. Previous hx of PE in 2012.     - Stop heparin gtt  - Start Xarelto 15 mg BID for PE       2. Hx of COPD; oxygen dependent; on 2-4 liters at baseline              -continue ellipta    3. Hx of NSTEMI              -continue metoprolol     4. Hx of lymphedema of lower extremities with venous stasis               -continue lasix    5. Hx of Type II DM; a1c of 6.4; diet controlled              -sliding scale insulin     Diet: Regular Diet Adult    DVT Prophylaxis: On Xarelto     Smith Catheter: not present  Code Status: Full Code      Disposition Plan   Expected discharge: Tomorrow, recommended to prior living arrangement   Entered: Anshul Reilly MD 08/17/2019, 10:42 AM       The patient's care was discussed with the Patient and nursing staff.    Anshul Reilly MD  Hospitalist Service  [facility]    ______________________________________________________________________    Interval History   SOB at baseline. Uses home oxygen. No nausea or vomiting. No chest pain or SOB     Data reviewed today: I reviewed all medications, new labs and imaging results over the last 24 hours. I personally reviewed no images or EKG's today.    Physical Exam   Vital Signs: Temp: 98.2  F (36.8  C) Temp src: Tympanic BP: 152/69 Pulse: 93 Heart Rate: 57 Resp: 18 SpO2: (!) 90 % O2 Device: Nasal cannula Oxygen Delivery: 4 LPM  Weight: 300 lbs 0 oz  HEENT: NCAT EOMI MMM  Neck: Supple  CV: bradycardic normal s1 s2  Lungs:  CTAB  Abd: soft, nt, nd  Neuro:AOX3; CN intact; nonfocal screening exam  MSK: age appropriate muscle mass  Psych: appropriate affect  Skin: warm, dry; no rash on face  Venous stasis of lower extremities with 2+ bilateral nonpitting edema     Data   Recent Labs   Lab 08/17/19  0512 08/16/19  1259   WBC  --  7.9   HGB  --  13.5   MCV  --  99   PLT  --  152    138   POTASSIUM 3.8 4.0   CHLORIDE 104 101   CO2 32 31   BUN 14 9   CR 0.67 0.66   ANIONGAP 5 6   DARCY 8.6 8.7   * 132*   ALBUMIN  --  3.3*   PROTTOTAL  --  7.1   BILITOTAL  --  0.9   ALKPHOS  --  76   ALT  --  25   AST  --  15   TROPI  --  <0.015

## 2019-08-17 NOTE — PLAN OF CARE
Face to face report given with opportunity to observe patient.    Report given to casandra Negrete   8/16/2019  11:03 PM

## 2019-08-17 NOTE — PROGRESS NOTES
Patient with reported irregular rhythm on tele per nursing staff he is asymptomatic  Plan: check trop, ekg; potassium, mg, bnp  Continue tele  /75   Pulse 93   Temp 97  F (36.1  C) (Temporal)   Resp 18   Wt 136.1 kg (300 lb)   SpO2 (!) 90%   BMI 44.30 kg/m      Grady Galindo

## 2019-08-17 NOTE — PLAN OF CARE
Pt sitting on edge of bed. Alert and orientated. denies chest pain in upper right area.  Chronic SOB. In 2.5L O2 nc.  sats 93%.   Has skin stasis discolor on lower legs.  Noticed scab size of 50cent piece.on  right shin area. Has fallen recently tripped over object.   uses cane and walker at home.  Lungs clear and dim.  Obese  Abd. Rounded- voiding OK and normal BM's.

## 2019-08-17 NOTE — PLAN OF CARE
Face to face report given with opportunity to observe patient.    Report given to Ankit Lopez RN  8/17/2019  4:15 PM

## 2019-08-17 NOTE — H&P
[facility]    History and Physical - Hospitalist Service       Date of Admission:  8/16/2019    Assessment & Plan   Freddy Bermudez is a 63 year old male admitted on 8/16/2019. He is presenting with acute PE    1. Pleuritic chest pain secondary to recurrent PE; CT PE study showing  Good quality examination positive for pulmonary emboli with small filling defects within the subsegmental arterial branches of the lower lobes; he was started on heparin infusion and admitted for further evaluation. Previous hx of PE in 2012.    -tele   -continue heparin infusion; transition to NOAC in AM   -echo   -venous doppler US lower extremities   -pain control    2. Hx of COPD; oxygen dependent; on 2-4 liters at baseline   -continue ellipta  3. Hx of NSTEMI   -continue metoprolol   4. Hx of lymphedema of lower extremities with venous stasis    -continue lasix  5. Hx of Type II DM; a1c of 6.4; diet controlled   -sliding scale insulin          Diet: Regular Diet Adult    DVT Prophylaxis: heparin infusion   Smith Catheter: not present  Code Status: Full Code      Disposition Plan   Expected discharge: Tomorrow, recommended to prior living arrangement once PE workup/treatment.  Entered: Grady Galindo MD 08/17/2019, 5:23 AM     The patient's care was discussed with the Patient.    Grady Galindo MD  [facility]    ______________________________________________________________________    Chief Complaint   SOB    History is obtained from the patient    History of Present Illness   Freddy Bermudez is a 63 year old male with PMHX of emphysema; on chronic oxygen 2-4 liters at baseline presenting for chest pain. Over last 24 hours he has had progressively worsening chest pain; intermittent; pleuritic. He also has endorsed SOB and fatigue. Denies fever, cough, headache, dizziness, nausea, abdominal pain. He has chronic lower extremity venous stasis with edema unchanged. He is noncompliant with CPAP for ELÍAS. He has  previous hx of PE in 2012. In the ED CT PE study Good quality examination positive for pulmonary emboli with small filling defects within the subsegmental arterial branches of the lower lobes; he was started on heparin infusion and admitted for further evaluation.     Review of Systems    The 10 point Review of Systems is negative other than noted in the HPI or here.     Past Medical History    I have reviewed this patient's medical history and updated it with pertinent information if needed.   Past Medical History:   Diagnosis Date     Hypertension      Sleep apnea     CPAP       Past Surgical History   I have reviewed this patient's surgical history and updated it with pertinent information if needed.  Past Surgical History:   Procedure Laterality Date     APPENDECTOMY       CHOLECYSTECTOMY       ORTHOPEDIC SURGERY       PHACOEMULSIFICATION CLEAR CORNEA WITH TORIC INTRAOCULAR LENS IMPLANT  3/10/2014    Procedure: PHACOEMULSIFICATION CLEAR CORNEA WITH TORIC INTRAOCULAR LENS IMPLANT;  CATARACT EXTRACTION WITH INTRA OCULAR LENS RIGHT(TORIC);  Surgeon: David Garcia MD;  Location: [facility] OR     PHACOEMULSIFICATION CLEAR CORNEA WITH TORIC INTRAOCULAR LENS IMPLANT  3/25/2014    Procedure: PHACOEMULSIFICATION CLEAR CORNEA WITH TORIC INTRAOCULAR LENS IMPLANT;  CATARACT EXTRACTION WITH INTRA OCULAR LENS LEFT TORIC;  Surgeon: David Garcia MD;  Location: [facility] OR       Social History   I have reviewed this patient's social history and updated it with pertinent information if needed.  Social History     Tobacco Use     Smoking status: Former Smoker     Smokeless tobacco: Never Used   Substance Use Topics     Alcohol use: Yes     Drug use: No       Family History   I have reviewed this patient's family history and updated it with pertinent information if needed.   No family hx of DVT    Prior to Admission Medications   Prior to Admission Medications   Prescriptions Last Dose Informant Patient Reported? Taking?    Fluticasone-Umeclidin-Vilant (TRELEGY ELLIPTA IN)   Yes Yes   Sig: Inhale 1 puff into the lungs daily   blood glucose monitoring (SHANIQUE CONTOUR) test strip   No No   Sig: Use to test blood sugar 1 times daily   blood glucose monitoring (SHANIQUE MICROLET) lancets   No No   Sig: Use to test blood sugar 1 times daily   furosemide (LASIX) 40 MG tablet 8/15/2019 at 1000   Yes Yes   Sig: Take 40 mg by mouth daily   metoprolol succinate ER (TOPROL-XL) 100 MG 24 hr tablet Unknown at Unknown time  Yes No   Sig: Take 100 mg by mouth daily   multivitamin w/minerals (MULTI-VITAMIN) tablet 8/15/2019 at 1000  Yes Yes   Sig: Take 1 tablet by mouth daily   potassium chloride ER (K-TAB) 20 MEQ CR tablet 8/15/2019 at 1000  Yes Yes   Sig: Take 20 mEq by mouth      Facility-Administered Medications: None     Allergies   Allergies   Allergen Reactions     Augmentin Swelling and Rash     Facial swelling       Physical Exam   Vital Signs: Temp: 98.2  F (36.8  C) Temp src: Tympanic BP: 139/70 Pulse: 93 Heart Rate: (!) 49 Resp: 16 SpO2: 95 % O2 Device: Nasal cannula Oxygen Delivery: 4 LPM  Weight: 300 lbs 0 oz       Gen: no acute distress  HEENT: NCAT EOMI MMM  Neck: Supple  CV: bradycardic normal s1 s2  Lungs: CTAB  Abd: soft, nt, nd  Neuro:AOX3; CN intact; nonfocal screening exam  MSK: age appropriate muscle mass  Psych: appropriate affect  Skin: warm, dry; no rash on face  Venous stasis of lower extremities with 2+ bilateral nonpitting edema     Data   Data reviewed today: I reviewed all medications, new labs and imaging results over the last 24 hours. I personally reviewed todays labs    Recent Labs   Lab 08/16/19  1259   WBC 7.9   HGB 13.5   MCV 99         POTASSIUM 4.0   CHLORIDE 101   CO2 31   BUN 9   CR 0.66   ANIONGAP 6   DARCY 8.7   *   ALBUMIN 3.3*   PROTTOTAL 7.1   BILITOTAL 0.9   ALKPHOS 76   ALT 25   AST 15   TROPI <0.015     Recent Results (from the past 24 hour(s))   CTA Chest with Contrast    Narrative     CTA CHEST WITH CONTRAST  8/16/2019 2:41 PM    CLINICAL HISTORY: Male, age 63 years,  PE suspected, intermediate  prob, positive D-dimer;    Comparison:  CT scan of the chest 4/20/2019    TECHNIQUE:  CT was performed of the chest  with IV contrast.   Sagittal, coronal, axial and MIP reconstructions were reviewed.     FINDINGS:  Chest CT:   Lungs : Emphysema, peripheral areas of scarring and atelectasis are  similar in appearance.  Thyroid: The visualized portions are normal.  Heart and Great Vessels:  Good quality examination demonstrates small  filling defects within the subsegmental arterial branches of the lower  lobes.  Lymph Nodes:  Normal.  Pleura: Normal.  Bony Structures:  Mild degenerative changes of the thoracic spine.  Esophagus/stomach: Normal.    Visualized portions of the upper abdomen demonstrate surgical absence  of the gallbladder. No acute abnormality.      Impression    IMPRESSION:   Good quality examination positive for pulmonary emboli with small  filling defects within the subsegmental arterial branches of the lower  lobes.     These results were discussed with Jose Magdaleno on 8/16/2019 at 1503  hours.  This represents a Critical Finding.    BHARATH HARRINGTON MD

## 2019-08-17 NOTE — PLAN OF CARE
Reason for hospital stay:  PE's    Most recent vitals: /70   Pulse 93   Temp 98.2  F (36.8  C) (Tympanic)   Resp 16   Wt 136.1 kg (300 lb)   SpO2 95%   BMI 44.30 kg/m    Pain Management:  Denies pain. Denies chest pain  Orientation:  A&O  Cardiac:  HR SR to SB 50-60's with BBB. HR noted 47 when sleeping soundly, the Pt reports that his heart jumps all over depending on what he's doing  Respiratory:  LS very diminished bases to middle lobes. PA. O2 of 4 LPM per home use, does de-sat when soundly sleeping and has noted periods of apnea.  GI:  BS active. Denies nausea  :  Voids with some dribbling reported  Diet: Reg diet.  this am, no coverage per order  Skin Issues:  Venous statis changes to BLE. Scab tot he right shin  IVF:  Heparin Drip infusing. 10a at 0500 of 0.54, per order no change to the infusion rate  ABX:  None  Mobility:  SBA to the BR. Ind to use urinal bedside  Safety:  Call light within reach. No falls or injuries this shift    Comments: Potassium and magnesium wnl, no need to replace    8/17/2019  6:41 AM  Maria Alejandra Buchanan    Face to face report given with opportunity to observe patient.    Report given to RICH Bailey   8/17/2019  7:20 AM

## 2019-08-18 VITALS
OXYGEN SATURATION: 91 % | BODY MASS INDEX: 44.21 KG/M2 | SYSTOLIC BLOOD PRESSURE: 129 MMHG | TEMPERATURE: 98.1 F | DIASTOLIC BLOOD PRESSURE: 72 MMHG | WEIGHT: 299.38 LBS | RESPIRATION RATE: 20 BRPM | HEART RATE: 93 BPM

## 2019-08-18 LAB
ANION GAP SERPL CALCULATED.3IONS-SCNC: 4 MMOL/L (ref 3–14)
BACTERIA SPEC CULT: NORMAL
BUN SERPL-MCNC: 17 MG/DL (ref 7–30)
CALCIUM SERPL-MCNC: 9 MG/DL (ref 8.5–10.1)
CHLORIDE SERPL-SCNC: 105 MMOL/L (ref 94–109)
CO2 SERPL-SCNC: 32 MMOL/L (ref 20–32)
CREAT SERPL-MCNC: 0.77 MG/DL (ref 0.66–1.25)
ERYTHROCYTE [DISTWIDTH] IN BLOOD BY AUTOMATED COUNT: 14.3 % (ref 10–15)
GFR SERPL CREATININE-BSD FRML MDRD: >90 ML/MIN/{1.73_M2}
GLUCOSE SERPL-MCNC: 116 MG/DL (ref 70–99)
HCT VFR BLD AUTO: 40.9 % (ref 40–53)
HGB BLD-MCNC: 13.3 G/DL (ref 13.3–17.7)
MCH RBC QN AUTO: 32.8 PG (ref 26.5–33)
MCHC RBC AUTO-ENTMCNC: 32.5 G/DL (ref 31.5–36.5)
MCV RBC AUTO: 101 FL (ref 78–100)
PLATELET # BLD AUTO: 150 10E9/L (ref 150–450)
POTASSIUM SERPL-SCNC: 4.1 MMOL/L (ref 3.4–5.3)
RBC # BLD AUTO: 4.06 10E12/L (ref 4.4–5.9)
SODIUM SERPL-SCNC: 141 MMOL/L (ref 133–144)
SPECIMEN SOURCE: NORMAL
WBC # BLD AUTO: 8.3 10E9/L (ref 4–11)

## 2019-08-18 PROCEDURE — 94640 AIRWAY INHALATION TREATMENT: CPT

## 2019-08-18 PROCEDURE — 99239 HOSP IP/OBS DSCHRG MGMT >30: CPT | Performed by: INTERNAL MEDICINE

## 2019-08-18 PROCEDURE — 25000132 ZZH RX MED GY IP 250 OP 250 PS 637: Mod: GY | Performed by: HOSPITALIST

## 2019-08-18 PROCEDURE — 40000275 ZZH STATISTIC RCP TIME EA 10 MIN

## 2019-08-18 PROCEDURE — 25000132 ZZH RX MED GY IP 250 OP 250 PS 637: Mod: GY | Performed by: INTERNAL MEDICINE

## 2019-08-18 PROCEDURE — 94640 AIRWAY INHALATION TREATMENT: CPT | Mod: 76

## 2019-08-18 PROCEDURE — 85027 COMPLETE CBC AUTOMATED: CPT | Performed by: INTERNAL MEDICINE

## 2019-08-18 PROCEDURE — 80048 BASIC METABOLIC PNL TOTAL CA: CPT | Performed by: INTERNAL MEDICINE

## 2019-08-18 PROCEDURE — 36415 COLL VENOUS BLD VENIPUNCTURE: CPT | Performed by: INTERNAL MEDICINE

## 2019-08-18 RX ADMIN — METOPROLOL SUCCINATE 100 MG: 100 TABLET, FILM COATED, EXTENDED RELEASE ORAL at 08:20

## 2019-08-18 RX ADMIN — RIVAROXABAN 15 MG: 15 TABLET, FILM COATED ORAL at 08:20

## 2019-08-18 RX ADMIN — FLUTICASONE FUROATE AND VILANTEROL TRIFENATATE 1 PUFF: 100; 25 POWDER RESPIRATORY (INHALATION) at 07:42

## 2019-08-18 RX ADMIN — UMECLIDINIUM 1 PUFF: 62.5 AEROSOL, POWDER ORAL at 07:43

## 2019-08-18 RX ADMIN — FUROSEMIDE 40 MG: 40 TABLET ORAL at 08:19

## 2019-08-18 ASSESSMENT — ACTIVITIES OF DAILY LIVING (ADL)
ADLS_ACUITY_SCORE: 15

## 2019-08-18 NOTE — PLAN OF CARE
Pt monitored on telemetry - around 1700 ICU noted irregular rhythm as charted, pt was asymptomatic, MD was notified via ICU.  Trop, K+, BNP Mag, EKG ordered and WDL.  Pt is chronically on 4 LPM O2 and is on 4 here also - SATS 88-93%, lung sounds dim but clear. Pt ate all supper.  Up in chair for most of evening shift per MD request.  No c/o chest pain or any other pain.  Gave 15 mg Xarelto with supper.  Pt uses urinal independently.  Call light in reach, makes needs known.      Face to face report given with opportunity to observe patient.    Report given to Maria Alejandra Olsen   8/17/2019  11:10 PM

## 2019-08-18 NOTE — PLAN OF CARE
Reason for hospital stay:  PE's    Most recent vitals: /78   Pulse 93   Temp 97.9  F (36.6  C) (Tympanic)   Resp 18   Wt 135.8 kg (299 lb 6.2 oz)   SpO2 (!) 91%   BMI 44.21 kg/m    Pain Management:  Denies pain  Orientation:  A&O  Cardiac:  Telemetry reading SB 50's with BBB, inverted T waves, and prolonged QT per ICU   Respiratory:  LS very dim in the bases and mid lobes. Denies sob. O2 of 4 LPM maintaining 88-94%  GI:  BS active. Denies nausea  :  Voids using the urinal  Diet: CHO.   Skin Issues:  Scab to RLE shin, stasis changes to BLE  IVF:  IV SL  ABX:  None  Mobility:  SBA to IND with urinal  Safety:  Call light within reach    Comments:    8/18/2019  6:17 AM  Maria Alejandra Buchanan

## 2019-08-18 NOTE — PLAN OF CARE
Patient discharged at 10:38 AM via wheel chair accompanied by staff. Prescriptions sent with patient to fill . All belongings sent with patient.     Discharge instructions reviewed with patient. Listed belongings gathered and returned to patient. yes    Patient discharged to home.   Report called to N/A    Core Measures and Vaccines  Core Measures applicable during stay: No. If yes, state diagnosis: N/A  Pneumonia and Influenza given prior to discharge, if indicated: N/A    Surgical Patient   Surgical Procedures during stay: N/A  Did patient receive discharge instruction on wound care and recognition of infection symptoms? N/A    MISC  Follow up appointment made:  patioent to set up his own follow up appointment  Home and hospital aquired medications returned to patient: N/A  Patient reports pain was well managed at discharge: Yes

## 2019-08-18 NOTE — PROVIDER NOTIFICATION
I paged Dr. Galindo to let him know that the patient has been in and out of SB & SB with trigeminy PVC's again with a rate as low as 35.  Pt is asymptomatic at this time.  Will continue to monitor.

## 2019-08-18 NOTE — DISCHARGE INSTRUCTIONS
Appointments:  Please make an appointment on Monday 8/19/19 for within 7 days of discharge with Dr. Gasca at [facility] for hospital follow up and follow up on change in medication.  You may call them at [phone].   31-Jan-2019 12:14

## 2019-08-18 NOTE — PROGRESS NOTES
Name: Freddy JENNY Bermudez    MRN#: [id number]    Reason for Hospitalization: Other acute pulmonary embolism without acute cor pulmonale (H) [I26.99]    Discharge Date: 8/18/2019    Patient / Family response to discharge plan: agreed    Follow-Up Appt: No future appointments.    Other Providers (Care Coordinator, County Services, PCA services etc): No    Discharge Disposition: home, transport by friend.    Tiarra Crowder

## 2019-08-18 NOTE — PLAN OF CARE
Face to face report given with opportunity to observe patient.    Report given to RICH Bailey   8/18/2019  7:30 AM

## 2019-08-18 NOTE — DISCHARGE SUMMARY
[facility]  Hospitalist Discharge Summary       Date of Admission:  8/16/2019  Date of Discharge:  8/18/2019  Discharging Provider: Anshul Reilly MD      Discharge Diagnoses   Acute pulmonary embolism     Follow-ups Needed After Discharge   F/u with PCP in 1 week     Unresulted Labs Ordered in the Past 30 Days of this Admission     Date and Time Order Name Status Description    8/16/2019 1914 Active MRSA Surveillance Culture Preliminary           Discharge Disposition   Discharged to home  Condition at discharge: Stable    Hospital Course       The patient is a 63-year-old morbid obesity male who presents to the hospital with chief complaint of shortness of breath.  The patient does have shortness of breath at baseline.  He uses oxygen at home secondary to COPD.  He does have a history of NSTEMI on metoprolol, lymphedema of bilateral lower extremities on Lasix, and type 2 diabetes with A1c of 6.4, diet-controlled, on sliding scale insulin.  The patient was found to have pulmonary embolism on the CT scan.  Pulmonary embolism with small filling defect in branches of the lower lobe.  He was started on heparin drip.  We did stop the heparin drip and did start the patient on Xarelto.  Discussed the risks of starting Xarelto, including intracranial hemorrhage and GI bleed.  The patient is agreeable for Xarelto.  We also gave the patient the option for Coumadin, but he was not interested in getting Coumadin as patient would require INR checks.  The patient is stable to be discharged home.  The patient denies any nausea or vomiting.  Denies any abdominal pain.  No signs of GI bleed.  He denies any chest pain or shortness of breath.  The patient is stable to be discharged home.  The patient will follow up with his primary care doctor in 1-2 weeks upon discharge.       Consultations This Hospital Stay   None    Code Status   Full Code    Time Spent on this Encounter   IAnshul, personally saw the  patient today and spent greater than 30 minutes discharging this patient.       Anshul Reilly MD  [facility]  ______________________________________________________________________    Physical Exam   Vital Signs: Temp: 97.9  F (36.6  C) Temp src: Tympanic BP: 173/78(follwoing using the urinal)   Heart Rate: 61 Resp: 18 SpO2: (!) 91 % O2 Device: Nasal cannula Oxygen Delivery: 4 LPM  Weight: 299 lbs 6.15 oz  HEENT: NCAT EOMI MMM  Neck: Supple  CV: bradycardic normal s1 s2  Lungs: CTAB  Abd: soft, nt, nd  Neuro:AOX3; CN intact; nonfocal screening exam  MSK: age appropriate muscle mass  Psych: appropriate affect  Skin: warm, dry; no rash on face  Venous stasis of lower extremities with 2+ bilateral nonpitting edema        Primary Care Physician   Umer Gasca    Discharge Orders   No discharge procedures on file.    Significant Results and Procedures   Most Recent 3 CBC's:  Recent Labs   Lab Test 08/18/19  0510 08/16/19  1259 04/20/19  1206   WBC 8.3 7.9 5.7   HGB 13.3 13.5 13.6   * 99 104*    152 162     Most Recent 3 BMP's:  Recent Labs   Lab Test 08/18/19  0510 08/17/19  1739 08/17/19  0512 08/16/19  1259     --  141 138   POTASSIUM 4.1 4.2 3.8 4.0   CHLORIDE 105  --  104 101   CO2 32  --  32 31   BUN 17  --  14 9   CR 0.77  --  0.67 0.66   ANIONGAP 4  --  5 6   DARCY 9.0  --  8.6 8.7   *  --  115* 132*     Most Recent 2 LFT's:  Recent Labs   Lab Test 08/16/19  1259 04/20/19  1206   AST 15 14   ALT 25 24   ALKPHOS 76 79   BILITOTAL 0.9 0.4       Discharge Medications   Current Discharge Medication List      START taking these medications    Details   rivaroxaban ANTICOAGULANT (XARELTO) 15 MG TABS tablet Take 1 tablet (15 mg) by mouth 2 times daily (with meals)  Qty: 90 tablet, Refills: 3    Associated Diagnoses: Other pulmonary embolism without acute cor pulmonale, unspecified chronicity (H)         CONTINUE these medications which have NOT CHANGED    Details    Fluticasone-Umeclidin-Vilant (TRELEGY ELLIPTA IN) Inhale 1 puff into the lungs daily      furosemide (LASIX) 40 MG tablet Take 40 mg by mouth daily      multivitamin w/minerals (MULTI-VITAMIN) tablet Take 1 tablet by mouth daily      potassium chloride ER (K-TAB) 20 MEQ CR tablet Take 20 mEq by mouth      blood glucose monitoring (SHANIQUE CONTOUR) test strip Use to test blood sugar 1 times daily  Qty: 100 each, Refills: 10    Associated Diagnoses: Type 2 diabetes mellitus without complication, with long-term current use of insulin (H)      blood glucose monitoring (SHANIQUE MICROLET) lancets Use to test blood sugar 1 times daily  Qty: 100 each, Refills: 10    Associated Diagnoses: Type 2 diabetes mellitus without complication, with long-term current use of insulin (H)      metoprolol succinate ER (TOPROL-XL) 100 MG 24 hr tablet Take 100 mg by mouth daily           Allergies   Allergies   Allergen Reactions     Augmentin Swelling and Rash     Facial swelling

## 2019-08-18 NOTE — DISCHARGE SUMMARY
Admit Date:     2019   Discharge Date:           HOSPITAL COURSE:  The patient is a 63-year-old morbid obesity male who presents to the hospital with chief complaint of shortness of breath.  The patient does have shortness of breath at baseline.  He uses oxygen at home secondary to COPD.  He does have a history of NSTEMI on metoprolol, lymphedema of bilateral lower extremities on Lasix, and type 2 diabetes with A1c of 6.4, diet-controlled, on sliding scale insulin.  The patient was found to have pulmonary embolism on the CT scan.  Pulmonary embolism with small filling defect in branches of the lower lobe.  He was started on heparin drip.  We did stop the heparin drip and did start the patient on Xarelto.  Discussed the risks of starting Xarelto, including intracranial hemorrhage and GI bleed.  The patient is agreeable for Xarelto.  We also gave the patient the option for Coumadin, but he was not interested in getting Coumadin as patient would require INR checks.  The patient is stable to be discharged home.  The patient denies any nausea or vomiting.  Denies any abdominal pain.  No signs of GI bleed.  He denies any chest pain or shortness of breath.  The patient is stable to be discharged home.  The patient will follow up with his primary care doctor in 1-2 weeks upon discharge.         CHRISTINE DILLON MD             D: 2019   T: 2019   MT: ROCAEL      Name:     KAILASH TOBAR   MRN:      [id number]        Account:        [id number]   :      1955           Admit Date:     2019                                  Discharge Date:       Document: [id number]       cc: Umer Gasca MD

## 2019-08-22 ENCOUNTER — TELEPHONE (OUTPATIENT)
Dept: CASE MANAGEMENT | Facility: HOSPITAL | Age: 64
End: 2019-08-22

## 2019-08-23 ENCOUNTER — TELEPHONE (OUTPATIENT)
Dept: CASE MANAGEMENT | Facility: HOSPITAL | Age: 64
End: 2019-08-23